# Patient Record
Sex: MALE | Race: WHITE | NOT HISPANIC OR LATINO
[De-identification: names, ages, dates, MRNs, and addresses within clinical notes are randomized per-mention and may not be internally consistent; named-entity substitution may affect disease eponyms.]

---

## 2022-11-22 ENCOUNTER — APPOINTMENT (OUTPATIENT)
Dept: CARDIOLOGY | Facility: CLINIC | Age: 62
End: 2022-11-22

## 2023-12-09 ENCOUNTER — INPATIENT (INPATIENT)
Facility: HOSPITAL | Age: 63
LOS: 0 days | Discharge: HOME | DRG: 309 | End: 2023-12-10
Attending: INTERNAL MEDICINE | Admitting: INTERNAL MEDICINE
Payer: COMMERCIAL

## 2023-12-09 VITALS
DIASTOLIC BLOOD PRESSURE: 114 MMHG | HEART RATE: 135 BPM | OXYGEN SATURATION: 97 % | RESPIRATION RATE: 22 BRPM | TEMPERATURE: 98 F | WEIGHT: 205.91 LBS | SYSTOLIC BLOOD PRESSURE: 175 MMHG

## 2023-12-09 DIAGNOSIS — Z98.890 OTHER SPECIFIED POSTPROCEDURAL STATES: Chronic | ICD-10-CM

## 2023-12-09 DIAGNOSIS — I48.91 UNSPECIFIED ATRIAL FIBRILLATION: ICD-10-CM

## 2023-12-09 LAB
ACANTHOCYTES BLD QL SMEAR: SLIGHT — SIGNIFICANT CHANGE UP
ACANTHOCYTES BLD QL SMEAR: SLIGHT — SIGNIFICANT CHANGE UP
ALBUMIN SERPL ELPH-MCNC: 4.3 G/DL — SIGNIFICANT CHANGE UP (ref 3.5–5.2)
ALBUMIN SERPL ELPH-MCNC: 4.3 G/DL — SIGNIFICANT CHANGE UP (ref 3.5–5.2)
ALP SERPL-CCNC: 245 U/L — HIGH (ref 30–115)
ALP SERPL-CCNC: 245 U/L — HIGH (ref 30–115)
ALT FLD-CCNC: 18 U/L — SIGNIFICANT CHANGE UP (ref 0–41)
ALT FLD-CCNC: 18 U/L — SIGNIFICANT CHANGE UP (ref 0–41)
ANION GAP SERPL CALC-SCNC: 15 MMOL/L — HIGH (ref 7–14)
ANION GAP SERPL CALC-SCNC: 15 MMOL/L — HIGH (ref 7–14)
ANISOCYTOSIS BLD QL: SLIGHT — SIGNIFICANT CHANGE UP
ANISOCYTOSIS BLD QL: SLIGHT — SIGNIFICANT CHANGE UP
APPEARANCE UR: CLEAR — SIGNIFICANT CHANGE UP
APPEARANCE UR: CLEAR — SIGNIFICANT CHANGE UP
AST SERPL-CCNC: 22 U/L — SIGNIFICANT CHANGE UP (ref 0–41)
AST SERPL-CCNC: 22 U/L — SIGNIFICANT CHANGE UP (ref 0–41)
BASE EXCESS BLDV CALC-SCNC: 0.7 MMOL/L — SIGNIFICANT CHANGE UP (ref -2–3)
BASE EXCESS BLDV CALC-SCNC: 0.7 MMOL/L — SIGNIFICANT CHANGE UP (ref -2–3)
BASOPHILS # BLD AUTO: 0 K/UL — SIGNIFICANT CHANGE UP (ref 0–0.2)
BASOPHILS # BLD AUTO: 0 K/UL — SIGNIFICANT CHANGE UP (ref 0–0.2)
BASOPHILS NFR BLD AUTO: 0 % — SIGNIFICANT CHANGE UP (ref 0–1)
BASOPHILS NFR BLD AUTO: 0 % — SIGNIFICANT CHANGE UP (ref 0–1)
BILIRUB SERPL-MCNC: 0.4 MG/DL — SIGNIFICANT CHANGE UP (ref 0.2–1.2)
BILIRUB SERPL-MCNC: 0.4 MG/DL — SIGNIFICANT CHANGE UP (ref 0.2–1.2)
BILIRUB UR-MCNC: NEGATIVE — SIGNIFICANT CHANGE UP
BILIRUB UR-MCNC: NEGATIVE — SIGNIFICANT CHANGE UP
BUN SERPL-MCNC: 17 MG/DL — SIGNIFICANT CHANGE UP (ref 10–20)
BUN SERPL-MCNC: 17 MG/DL — SIGNIFICANT CHANGE UP (ref 10–20)
BURR CELLS BLD QL SMEAR: PRESENT — SIGNIFICANT CHANGE UP
BURR CELLS BLD QL SMEAR: PRESENT — SIGNIFICANT CHANGE UP
CA-I SERPL-SCNC: 1.13 MMOL/L — LOW (ref 1.15–1.33)
CA-I SERPL-SCNC: 1.13 MMOL/L — LOW (ref 1.15–1.33)
CALCIUM SERPL-MCNC: 9.1 MG/DL — SIGNIFICANT CHANGE UP (ref 8.4–10.5)
CALCIUM SERPL-MCNC: 9.1 MG/DL — SIGNIFICANT CHANGE UP (ref 8.4–10.5)
CHLORIDE SERPL-SCNC: 101 MMOL/L — SIGNIFICANT CHANGE UP (ref 98–110)
CHLORIDE SERPL-SCNC: 101 MMOL/L — SIGNIFICANT CHANGE UP (ref 98–110)
CO2 SERPL-SCNC: 21 MMOL/L — SIGNIFICANT CHANGE UP (ref 17–32)
CO2 SERPL-SCNC: 21 MMOL/L — SIGNIFICANT CHANGE UP (ref 17–32)
COLOR SPEC: YELLOW — SIGNIFICANT CHANGE UP
COLOR SPEC: YELLOW — SIGNIFICANT CHANGE UP
CREAT SERPL-MCNC: 0.8 MG/DL — SIGNIFICANT CHANGE UP (ref 0.7–1.5)
CREAT SERPL-MCNC: 0.8 MG/DL — SIGNIFICANT CHANGE UP (ref 0.7–1.5)
DACRYOCYTES BLD QL SMEAR: SLIGHT — SIGNIFICANT CHANGE UP
DACRYOCYTES BLD QL SMEAR: SLIGHT — SIGNIFICANT CHANGE UP
DIFF PNL FLD: NEGATIVE — SIGNIFICANT CHANGE UP
DIFF PNL FLD: NEGATIVE — SIGNIFICANT CHANGE UP
EGFR: 98 ML/MIN/1.73M2 — SIGNIFICANT CHANGE UP
EGFR: 98 ML/MIN/1.73M2 — SIGNIFICANT CHANGE UP
ELLIPTOCYTES BLD QL SMEAR: SLIGHT — SIGNIFICANT CHANGE UP
ELLIPTOCYTES BLD QL SMEAR: SLIGHT — SIGNIFICANT CHANGE UP
EOSINOPHIL # BLD AUTO: 0.13 K/UL — SIGNIFICANT CHANGE UP (ref 0–0.7)
EOSINOPHIL # BLD AUTO: 0.13 K/UL — SIGNIFICANT CHANGE UP (ref 0–0.7)
EOSINOPHIL NFR BLD AUTO: 0.9 % — SIGNIFICANT CHANGE UP (ref 0–8)
EOSINOPHIL NFR BLD AUTO: 0.9 % — SIGNIFICANT CHANGE UP (ref 0–8)
GAS PNL BLDV: 130 MMOL/L — LOW (ref 136–145)
GAS PNL BLDV: 130 MMOL/L — LOW (ref 136–145)
GAS PNL BLDV: SIGNIFICANT CHANGE UP
GIANT PLATELETS BLD QL SMEAR: PRESENT — SIGNIFICANT CHANGE UP
GIANT PLATELETS BLD QL SMEAR: PRESENT — SIGNIFICANT CHANGE UP
GLUCOSE SERPL-MCNC: 120 MG/DL — HIGH (ref 70–99)
GLUCOSE SERPL-MCNC: 120 MG/DL — HIGH (ref 70–99)
GLUCOSE UR QL: NEGATIVE MG/DL — SIGNIFICANT CHANGE UP
GLUCOSE UR QL: NEGATIVE MG/DL — SIGNIFICANT CHANGE UP
HCO3 BLDV-SCNC: 25 MMOL/L — SIGNIFICANT CHANGE UP (ref 22–29)
HCO3 BLDV-SCNC: 25 MMOL/L — SIGNIFICANT CHANGE UP (ref 22–29)
HCT VFR BLD CALC: 34.6 % — LOW (ref 42–52)
HCT VFR BLD CALC: 34.6 % — LOW (ref 42–52)
HCT VFR BLDA CALC: 50 % — SIGNIFICANT CHANGE UP (ref 39–51)
HCT VFR BLDA CALC: 50 % — SIGNIFICANT CHANGE UP (ref 39–51)
HGB BLD CALC-MCNC: 16.5 G/DL — SIGNIFICANT CHANGE UP (ref 12.6–17.4)
HGB BLD CALC-MCNC: 16.5 G/DL — SIGNIFICANT CHANGE UP (ref 12.6–17.4)
HGB BLD-MCNC: 10.8 G/DL — LOW (ref 14–18)
HGB BLD-MCNC: 10.8 G/DL — LOW (ref 14–18)
KETONES UR-MCNC: 15 MG/DL
KETONES UR-MCNC: 15 MG/DL
LACTATE BLDV-MCNC: 1.6 MMOL/L — SIGNIFICANT CHANGE UP (ref 0.5–2)
LACTATE BLDV-MCNC: 1.6 MMOL/L — SIGNIFICANT CHANGE UP (ref 0.5–2)
LEUKOCYTE ESTERASE UR-ACNC: NEGATIVE — SIGNIFICANT CHANGE UP
LEUKOCYTE ESTERASE UR-ACNC: NEGATIVE — SIGNIFICANT CHANGE UP
LYMPHOCYTES # BLD AUTO: 23.2 % — SIGNIFICANT CHANGE UP (ref 20.5–51.1)
LYMPHOCYTES # BLD AUTO: 23.2 % — SIGNIFICANT CHANGE UP (ref 20.5–51.1)
LYMPHOCYTES # BLD AUTO: 3.36 K/UL — SIGNIFICANT CHANGE UP (ref 1.2–3.4)
LYMPHOCYTES # BLD AUTO: 3.36 K/UL — SIGNIFICANT CHANGE UP (ref 1.2–3.4)
MAGNESIUM SERPL-MCNC: 2 MG/DL — SIGNIFICANT CHANGE UP (ref 1.8–2.4)
MAGNESIUM SERPL-MCNC: 2 MG/DL — SIGNIFICANT CHANGE UP (ref 1.8–2.4)
MANUAL SMEAR VERIFICATION: SIGNIFICANT CHANGE UP
MANUAL SMEAR VERIFICATION: SIGNIFICANT CHANGE UP
MCHC RBC-ENTMCNC: 22.7 PG — LOW (ref 27–31)
MCHC RBC-ENTMCNC: 22.7 PG — LOW (ref 27–31)
MCHC RBC-ENTMCNC: 31.2 G/DL — LOW (ref 32–37)
MCHC RBC-ENTMCNC: 31.2 G/DL — LOW (ref 32–37)
MCV RBC AUTO: 72.7 FL — LOW (ref 80–94)
MCV RBC AUTO: 72.7 FL — LOW (ref 80–94)
METAMYELOCYTES # FLD: 0.9 % — HIGH (ref 0–0)
METAMYELOCYTES # FLD: 0.9 % — HIGH (ref 0–0)
MICROCYTES BLD QL: SLIGHT — SIGNIFICANT CHANGE UP
MICROCYTES BLD QL: SLIGHT — SIGNIFICANT CHANGE UP
MONOCYTES # BLD AUTO: 0.77 K/UL — HIGH (ref 0.1–0.6)
MONOCYTES # BLD AUTO: 0.77 K/UL — HIGH (ref 0.1–0.6)
MONOCYTES NFR BLD AUTO: 5.3 % — SIGNIFICANT CHANGE UP (ref 1.7–9.3)
MONOCYTES NFR BLD AUTO: 5.3 % — SIGNIFICANT CHANGE UP (ref 1.7–9.3)
MYELOCYTES NFR BLD: 0.9 % — HIGH (ref 0–0)
MYELOCYTES NFR BLD: 0.9 % — HIGH (ref 0–0)
NEUTROPHILS # BLD AUTO: 9.72 K/UL — HIGH (ref 1.4–6.5)
NEUTROPHILS # BLD AUTO: 9.72 K/UL — HIGH (ref 1.4–6.5)
NEUTROPHILS NFR BLD AUTO: 67 % — SIGNIFICANT CHANGE UP (ref 42.2–75.2)
NEUTROPHILS NFR BLD AUTO: 67 % — SIGNIFICANT CHANGE UP (ref 42.2–75.2)
NITRITE UR-MCNC: NEGATIVE — SIGNIFICANT CHANGE UP
NITRITE UR-MCNC: NEGATIVE — SIGNIFICANT CHANGE UP
NT-PROBNP SERPL-SCNC: 495 PG/ML — HIGH (ref 0–300)
NT-PROBNP SERPL-SCNC: 495 PG/ML — HIGH (ref 0–300)
PCO2 BLDV: 40 MMHG — LOW (ref 42–55)
PCO2 BLDV: 40 MMHG — LOW (ref 42–55)
PH BLDV: 7.41 — SIGNIFICANT CHANGE UP (ref 7.32–7.43)
PH BLDV: 7.41 — SIGNIFICANT CHANGE UP (ref 7.32–7.43)
PH UR: 7 — SIGNIFICANT CHANGE UP (ref 5–8)
PH UR: 7 — SIGNIFICANT CHANGE UP (ref 5–8)
PLAT MORPH BLD: NORMAL — SIGNIFICANT CHANGE UP
PLAT MORPH BLD: NORMAL — SIGNIFICANT CHANGE UP
PLATELET # BLD AUTO: 181 K/UL — SIGNIFICANT CHANGE UP (ref 130–400)
PLATELET # BLD AUTO: 181 K/UL — SIGNIFICANT CHANGE UP (ref 130–400)
PMV BLD: 10 FL — SIGNIFICANT CHANGE UP (ref 7.4–10.4)
PMV BLD: 10 FL — SIGNIFICANT CHANGE UP (ref 7.4–10.4)
PO2 BLDV: 41 MMHG — SIGNIFICANT CHANGE UP (ref 25–45)
PO2 BLDV: 41 MMHG — SIGNIFICANT CHANGE UP (ref 25–45)
POIKILOCYTOSIS BLD QL AUTO: SIGNIFICANT CHANGE UP
POIKILOCYTOSIS BLD QL AUTO: SIGNIFICANT CHANGE UP
POLYCHROMASIA BLD QL SMEAR: SLIGHT — SIGNIFICANT CHANGE UP
POLYCHROMASIA BLD QL SMEAR: SLIGHT — SIGNIFICANT CHANGE UP
POTASSIUM BLDV-SCNC: 3.2 MMOL/L — LOW (ref 3.5–5.1)
POTASSIUM BLDV-SCNC: 3.2 MMOL/L — LOW (ref 3.5–5.1)
POTASSIUM SERPL-MCNC: 3.5 MMOL/L — SIGNIFICANT CHANGE UP (ref 3.5–5)
POTASSIUM SERPL-MCNC: 3.5 MMOL/L — SIGNIFICANT CHANGE UP (ref 3.5–5)
POTASSIUM SERPL-SCNC: 3.5 MMOL/L — SIGNIFICANT CHANGE UP (ref 3.5–5)
POTASSIUM SERPL-SCNC: 3.5 MMOL/L — SIGNIFICANT CHANGE UP (ref 3.5–5)
PROT SERPL-MCNC: 6.1 G/DL — SIGNIFICANT CHANGE UP (ref 6–8)
PROT SERPL-MCNC: 6.1 G/DL — SIGNIFICANT CHANGE UP (ref 6–8)
PROT UR-MCNC: NEGATIVE MG/DL — SIGNIFICANT CHANGE UP
PROT UR-MCNC: NEGATIVE MG/DL — SIGNIFICANT CHANGE UP
RBC # BLD: 4.76 M/UL — SIGNIFICANT CHANGE UP (ref 4.7–6.1)
RBC # BLD: 4.76 M/UL — SIGNIFICANT CHANGE UP (ref 4.7–6.1)
RBC # FLD: 17 % — HIGH (ref 11.5–14.5)
RBC # FLD: 17 % — HIGH (ref 11.5–14.5)
RBC BLD AUTO: ABNORMAL
RBC BLD AUTO: ABNORMAL
SAO2 % BLDV: 60.8 % — LOW (ref 67–88)
SAO2 % BLDV: 60.8 % — LOW (ref 67–88)
SCHISTOCYTES BLD QL AUTO: SLIGHT — SIGNIFICANT CHANGE UP
SCHISTOCYTES BLD QL AUTO: SLIGHT — SIGNIFICANT CHANGE UP
SMUDGE CELLS # BLD: PRESENT — SIGNIFICANT CHANGE UP
SMUDGE CELLS # BLD: PRESENT — SIGNIFICANT CHANGE UP
SODIUM SERPL-SCNC: 137 MMOL/L — SIGNIFICANT CHANGE UP (ref 135–146)
SODIUM SERPL-SCNC: 137 MMOL/L — SIGNIFICANT CHANGE UP (ref 135–146)
SP GR SPEC: 1.01 — SIGNIFICANT CHANGE UP (ref 1–1.03)
SP GR SPEC: 1.01 — SIGNIFICANT CHANGE UP (ref 1–1.03)
TROPONIN T SERPL-MCNC: <0.01 NG/ML — SIGNIFICANT CHANGE UP
TROPONIN T SERPL-MCNC: <0.01 NG/ML — SIGNIFICANT CHANGE UP
UROBILINOGEN FLD QL: 0.2 MG/DL — SIGNIFICANT CHANGE UP (ref 0.2–1)
UROBILINOGEN FLD QL: 0.2 MG/DL — SIGNIFICANT CHANGE UP (ref 0.2–1)
VARIANT LYMPHS # BLD: 1.8 % — SIGNIFICANT CHANGE UP (ref 0–5)
VARIANT LYMPHS # BLD: 1.8 % — SIGNIFICANT CHANGE UP (ref 0–5)
WBC # BLD: 14.5 K/UL — HIGH (ref 4.8–10.8)
WBC # BLD: 14.5 K/UL — HIGH (ref 4.8–10.8)
WBC # FLD AUTO: 14.5 K/UL — HIGH (ref 4.8–10.8)
WBC # FLD AUTO: 14.5 K/UL — HIGH (ref 4.8–10.8)

## 2023-12-09 PROCEDURE — 71045 X-RAY EXAM CHEST 1 VIEW: CPT | Mod: 26

## 2023-12-09 PROCEDURE — 85025 COMPLETE CBC W/AUTO DIFF WBC: CPT

## 2023-12-09 PROCEDURE — 93005 ELECTROCARDIOGRAM TRACING: CPT

## 2023-12-09 PROCEDURE — 85730 THROMBOPLASTIN TIME PARTIAL: CPT

## 2023-12-09 PROCEDURE — 84484 ASSAY OF TROPONIN QUANT: CPT

## 2023-12-09 PROCEDURE — G0378: CPT

## 2023-12-09 PROCEDURE — 83735 ASSAY OF MAGNESIUM: CPT

## 2023-12-09 PROCEDURE — 36415 COLL VENOUS BLD VENIPUNCTURE: CPT

## 2023-12-09 PROCEDURE — 85610 PROTHROMBIN TIME: CPT

## 2023-12-09 PROCEDURE — 84443 ASSAY THYROID STIM HORMONE: CPT

## 2023-12-09 PROCEDURE — 93010 ELECTROCARDIOGRAM REPORT: CPT | Mod: 76

## 2023-12-09 PROCEDURE — 99291 CRITICAL CARE FIRST HOUR: CPT

## 2023-12-09 PROCEDURE — 80053 COMPREHEN METABOLIC PANEL: CPT

## 2023-12-09 PROCEDURE — 93306 TTE W/DOPPLER COMPLETE: CPT

## 2023-12-09 RX ORDER — DILTIAZEM HCL 120 MG
10 CAPSULE, EXT RELEASE 24 HR ORAL
Qty: 125 | Refills: 0 | Status: DISCONTINUED | OUTPATIENT
Start: 2023-12-09 | End: 2023-12-10

## 2023-12-09 RX ORDER — APIXABAN 2.5 MG/1
5 TABLET, FILM COATED ORAL EVERY 12 HOURS
Refills: 0 | Status: DISCONTINUED | OUTPATIENT
Start: 2023-12-10 | End: 2023-12-10

## 2023-12-09 RX ORDER — HEPARIN SODIUM 5000 [USP'U]/ML
5000 INJECTION INTRAVENOUS; SUBCUTANEOUS EVERY 8 HOURS
Refills: 0 | Status: DISCONTINUED | OUTPATIENT
Start: 2023-12-09 | End: 2023-12-09

## 2023-12-09 RX ORDER — APIXABAN 2.5 MG/1
5 TABLET, FILM COATED ORAL ONCE
Refills: 0 | Status: COMPLETED | OUTPATIENT
Start: 2023-12-09 | End: 2023-12-09

## 2023-12-09 RX ORDER — DILTIAZEM HCL 120 MG
5 CAPSULE, EXT RELEASE 24 HR ORAL
Qty: 125 | Refills: 0 | Status: DISCONTINUED | OUTPATIENT
Start: 2023-12-09 | End: 2023-12-10

## 2023-12-09 RX ORDER — POTASSIUM CHLORIDE 20 MEQ
40 PACKET (EA) ORAL ONCE
Refills: 0 | Status: DISCONTINUED | OUTPATIENT
Start: 2023-12-09 | End: 2023-12-10

## 2023-12-09 RX ORDER — PANTOPRAZOLE SODIUM 20 MG/1
40 TABLET, DELAYED RELEASE ORAL
Refills: 0 | Status: DISCONTINUED | OUTPATIENT
Start: 2023-12-09 | End: 2023-12-10

## 2023-12-09 RX ORDER — DILTIAZEM HCL 120 MG
20 CAPSULE, EXT RELEASE 24 HR ORAL ONCE
Refills: 0 | Status: COMPLETED | OUTPATIENT
Start: 2023-12-09 | End: 2023-12-09

## 2023-12-09 RX ADMIN — Medication 20 MILLIGRAM(S): at 17:50

## 2023-12-09 RX ADMIN — Medication 10 MG/HR: at 21:37

## 2023-12-09 RX ADMIN — Medication 20 MILLIGRAM(S): at 15:35

## 2023-12-09 RX ADMIN — APIXABAN 5 MILLIGRAM(S): 2.5 TABLET, FILM COATED ORAL at 18:43

## 2023-12-09 RX ADMIN — Medication 5 MG/HR: at 18:45

## 2023-12-09 NOTE — ED ADULT NURSE NOTE - NSFALLHARMRISKINTERV_ED_ALL_ED
Communicate risk of Fall with Harm to all staff, patient, and family/Provide visual cue: red socks, yellow wristband, yellow gown, etc/Reinforce activity limits and safety measures with patient and family/Bed in lowest position, wheels locked, appropriate side rails in place/Call bell, personal items and telephone in reach/Instruct patient to call for assistance before getting out of bed/chair/stretcher/Non-slip footwear applied when patient is off stretcher/[place] to call system/Physically safe environment - no spills, clutter or unnecessary equipment/Purposeful Proactive Rounding/Room/bathroom lighting operational, light cord in reach Communicate risk of Fall with Harm to all staff, patient, and family/Provide visual cue: red socks, yellow wristband, yellow gown, etc/Reinforce activity limits and safety measures with patient and family/Bed in lowest position, wheels locked, appropriate side rails in place/Call bell, personal items and telephone in reach/Instruct patient to call for assistance before getting out of bed/chair/stretcher/Non-slip footwear applied when patient is off stretcher/[place] to call system/Physically safe environment - no spills, clutter or unnecessary equipment/Purposeful Proactive Rounding/Room/bathroom lighting operational, light cord in reach

## 2023-12-09 NOTE — ED PROVIDER NOTE - DIFFERENTIAL DIAGNOSIS
Differential Diagnosis The differential diagnosis for patients clinical presentation includes but is not limited to:  ACS, MI, aortic dissection, pneumothorax, pneumonia, MSK, pulmonary embolism  A Fib RVR

## 2023-12-09 NOTE — ED PROVIDER NOTE - WR ORDER NAME 1
What Type Of Note Output Would You Prefer (Optional)?: Bullet Format Xray Chest 1 View-PORTABLE IMMEDIATE How Severe Is Your Rash?: mild Is This A New Presentation, Or A Follow-Up?: Rash

## 2023-12-09 NOTE — ED PROVIDER NOTE - CARE PLAN
Principal Discharge DX:	Atrial fibrillation   1 Principal Discharge DX:	Atrial fibrillation with RVR

## 2023-12-09 NOTE — ED PROVIDER NOTE - CLINICAL SUMMARY MEDICAL DECISION MAKING FREE TEXT BOX
63-year-old male past medical history of prostate cancer with prior chemotherapy and surgery, hypertension, dyslipidemia, diabetes, WPW, CHF presents to the emergency department for palpitations that have been worsening over the past 2 weeks.  Patient had heart rate at home in the 140s.  Patient recent hospitalization in [place] and was started on diltiazem without anticoagulation.  Additional history obtained from patient's wife and they deny ever being told patient had atrial fibrillation.  Patient has no history of DVT or PE.  Patient denies any fever, chills, cough, URI symptoms.  No calf pain or leg swelling.    On exam, vital signs reviewed.  Patient is tachycardic in A-fib with RVR.  Patient has a narrow complex rhythm and is not in antidromic WPW.  Patient also took his home dose of diltiazem.  Patient is irregularly irregular and has clear lungs.  Gross neurological exam is unremarkable.  No calf tenderness or signs of fluid overload.  Benign abdominal exam.  IV placed and labs sent, chest x-ray performed.  Patient given multiple doses of IV diltiazem and was started on a drip.  VBG shows normal acid-base status, troponin negative, patient has a baseline white blood cell count of 14.5, electrolytes unremarkable.  Will admit to telemetry on a diltiazem drip for rate control for A-fib with RVR.  Patient Chads-Vasc is 3 and will start Eliquis.    ED work up reviewed and results and plan of care discussed with patient. Patient requires admission for further work up, monitoring, and management. Need for admission discussed with patient. 63-year-old male past medical history of prostate cancer with prior chemotherapy and surgery, hypertension, dyslipidemia, diabetes, WPW, CHF presents to the emergency department for palpitations that have been worsening over the past 2 weeks.  Patient had heart rate at home in the 140s.  Patient recent hospitalization in [place] and was started on diltiazem without anticoagulation.  Additional history obtained from patient's wife and they deny ever being told patient had atrial fibrillation.  Patient has no history of DVT or PE.  Patient denies any fever, chills, cough, URI symptoms.  No calf pain or leg swelling.    On exam, vital signs reviewed.  Patient is tachycardic in A-fib with RVR.  Patient has a narrow complex rhythm and is not in antidromic WPW.  Patient also took his home dose of diltiazem.  Patient is irregularly irregular and has clear lungs.  Gross neurological exam is unremarkable.  No calf tenderness or signs of fluid overload.  Benign abdominal exam.  IV placed and labs sent, chest x-ray performed.  Patient given multiple doses of IV diltiazem and was started on a drip.  VBG shows normal acid-base status, troponin negative, patient has a baseline white blood cell count of 14.5, electrolytes unremarkable.  Will admit to telemetry on a diltiazem drip for rate control for A-fib with RVR.  Patient Chads-Vasc is 3 and will start Eliquis.    ED work up reviewed and results and plan of care discussed with patient. Patient requires admission for further work up, monitoring, and management. Need for admission discussed with patient.

## 2023-12-09 NOTE — ED PROVIDER NOTE - PROGRESS NOTE DETAILS
TOSHIA: Heart rate improved somewhat after 2X push of Cardizem IV however still in 120s to 130s.  Started on Cardizem drip.  [id number] score 3.  Given Eliquis 1 dose.  Admitted to cardiac telemetry TOSHIA: Heart rate improved somewhat after 2X push of Cardizem IV however still in 120s to 130s.  Started on Cardizem drip.  [id number] score 3.  Given Eliquis 1 dose.  Admitted to cardiac telemetry

## 2023-12-09 NOTE — ED PROVIDER NOTE - PHYSICAL EXAMINATION
VITAL SIGNS: I have reviewed nursing notes and confirm.  CONSTITUTIONAL: Well-developed; well-nourished; in no acute distress.  SKIN: Skin exam is warm and dry, no acute rash.  EYES: PERRL; conjunctiva and sclera clear.  ENT: mmm  CARD: S1, S2 tachycardic, irregular, well perfused  RESP: Normal respiratory effort, no tachypnea or distress. Lungs CTAB, no wheezes, rales or rhonchi.  ABD: soft, NT/ND.  EXT: Normal ROM. No clubbing, cyanosis or edema.  NEURO: Alert, oriented. Grossly unremarkable. No focal deficits.  PSYCH: Cooperative, appropriate.

## 2023-12-09 NOTE — H&P CARDIOLOGY - HISTORY OF PRESENT ILLNESS
63-year-old male past medical history of prostate cancer with prior chemotherapy and surgery, hypertension, dyslipidemia, diabetes, WPW, CHF presents to the emergency department for palpitations that have been worsening over the past 2 weeks.  Patient had heart rate at home in the 140s.  Patient recent hospitalization in [place] and was started on diltiazem without anticoagulation.      63-year-old male past medical history of prostate cancer with prior chemotherapy and surgery, hypertension, dyslipidemia, diabetes, WPW, CHF presents to the emergency department for palpitations that have been worsening over the past 2 weeks.  Patient had heart rate at home in the 140s.  Patient recent hospitalization in [place] and was started on diltiazem without anticoagulation.      63-year-old male past medical history of prostate cancer with prior chemotherapy and surgery, hypertension, dyslipidemia, diabetes, WPW, CHF presents to the emergency department for palpitations that have been worsening over the past 2 weeks.  Patient had heart rate at home in the 140s.  Patient recent hospitalization in [place] and was started on diltiazem without anticoagulation. He feels short of breath, as if he cannot take a deep breath in. He denies chest pain. No syncope. No leg swelling, orthopnea or PND.      63-year-old male past medical history of prostate cancer with prior chemotherapy and surgery, hypertension, dyslipidemia, diabetes, WPW, CHF presents to the emergency department for palpitations that have been worsening over the past 2 weeks.  Patient had heart rate at home in the 140s.  Patient recent hospitalization in [place] and was started on diltiazem without anticoagulation. He feels short of breath, as if he cannot take a deep breath in. He denies chest pain. No syncope. No leg swelling, orthopnea or PND.

## 2023-12-09 NOTE — H&P CARDIOLOGY - NS ATTEND BILL GEN_ALL_CORE
Please advise message below. Would you like to evaluate patient before prescribing? Patient has not been seen in over a year.  She does have an upcoming appointment on 3/21/23.        Attending to bill

## 2023-12-09 NOTE — H&P CARDIOLOGY - TIME BILLING
Chart review, bedside evaluation, discussion with patient and family, medical record review from [facility].

## 2023-12-09 NOTE — H&P CARDIOLOGY - NS ATTEND AMEND GEN_ALL_CORE FT
Briefly, 63 year old man with Prostate CA presents with palpitations and shortness of breath, found to be in new onset atrial fibrillation. He says his symptoms have been going on for two weeks. He went [place] [facility] [place] and was told he had some "fluid in his lungs". He was started on Cardizem which he has not been taking. On 4T, he was started on a cardizem drip and converted to sinus rhythm.     VS, PE as above.    Telemetry, labs, imaging personally reviewed.     Plan:  - His CHADSVASC is 3; start eliquis 5mg BID. He has no history of bleeding. He has no surgeries coming up. We checked interactions with his chemotherapy and there are none.   - Stop cardizem drip and start metoprolol tartrate 25mg PO BID  - His echocardiogram was reviewed and LVEF is preserved. IVC is dilated and not collapsing. He most likely has HFpEF. Will do a trial of lasix IV.   - D/c planning for tomorrow. I will see him in the office on discharge. Briefly, 63 year old man with Prostate CA presents with palpitations and shortness of breath, found to be in new onset atrial fibrillation. He says his symptoms have been going on for two weeks. He went to [facility] and was told he had some "fluid in his lungs". He was started on Cardizem which he has not been taking. On 4T, he was started on a cardizem drip and converted to sinus rhythm.     VS, PE as above.    Telemetry, labs, imaging personally reviewed.     Plan:  - His CHADSVASC is 3; start eliquis 5mg BID. He has no history of bleeding. He has no surgeries coming up. We checked interactions with his chemotherapy and there are none.   - Stop cardizem drip and start metoprolol tartrate 25mg PO BID  - His echocardiogram was reviewed and LVEF is preserved. IVC is dilated and not collapsing. He most likely has HFpEF. Will do a trial of lasix IV.   - D/c planning for tomorrow. I will see him in the office on discharge.

## 2023-12-09 NOTE — ED PROVIDER NOTE - OBJECTIVE STATEMENT
63-year-old male with PMH of prostate CA s/p chemo and surgery, HTN, HLD, DM,?  CHF, presents ED for evaluation of palpitations and rapid heartbeat x 2 weeks worse today.  Reports heart rate of 140s at home so came to ED for evaluation.  + SOB today.  Patient states he was recently admitted to the hospital 2 weeks ago [place] where he follows with cardiology Dr. Lam, at that time had rapid heart rate and started on diltiazem but no anticoagulation.  Denies fever, cough, congestion, CP, abdominal pain, N/V/D, urinary symptoms. 63-year-old male with PMH of prostate CA s/p chemo and surgery, HTN, HLD, DM,?  CHF, presents ED for evaluation of palpitations and rapid heartbeat x 2 weeks worse today.  Reports heart rate of 140s at home so came to ED for evaluation.  + SOB today.  Patient states he was recently admitted to the hospital 2 weeks ago [place] where he follows with cardiology Dr. Lam, at that time had rapid heart rate and started on diltiazem but no anticoagulation.  Denies fever, cough, congestion, CP, abdominal pain, N/V/D, urinary symptoms.

## 2023-12-09 NOTE — ED PROVIDER NOTE - NSICDXPASTMEDICALHX_GEN_ALL_CORE_FT
PAST MEDICAL HISTORY:  DM (diabetes mellitus)     HLD (hyperlipidemia)     HTN (hypertension)     Prostate cancer

## 2023-12-09 NOTE — H&P CARDIOLOGY - COMMENTS
A-Fib   -Cardizem 10 mg/hr for rate control   -Elquis   -Echo  - EKG   -Labs     Prostate CA with mets to bone  -Continue Prednisone 2.5 Home dose  -F/u With MSK     GI/DVT Prophylaxis     Diet    Activity as tolerated A-Fib   -Cardizem 10 mg/hr for rate control   -Eliquis   -Echo  - EKG   -Labs     Prostate CA with mets to bone  -Continue Prednisone 2.5 Home dose  -F/u With MSK     GI/DVT Prophylaxis     Diet    Activity as tolerated

## 2023-12-09 NOTE — H&P CARDIOLOGY - GASTROINTESTINAL
Soft, non-tender, no hepatosplenomegaly, normal bowel sounds Advancement Flap (Double) Text: Due to geometric and functional constraints, a flap reconstruction was performed to reconstruct the defect. To that end, adjacent tissue was incised and carried over to close the defect in the following manner: The defect edges were debeveled with a #15 scalpel blade.  Given the location of the defect and the proximity to free margins a double advancement flap was deemed most appropriate.  Using a sterile surgical marker, the appropriate advancement flaps were drawn incorporating the defect and placing the expected incisions within the relaxed skin tension lines where possible.    The area thus outlined was incised deep to adipose tissue with a #15 scalpel blade.  The skin margins were undermined to an appropriate distance in all directions utilizing iris scissors.

## 2023-12-10 ENCOUNTER — TRANSCRIPTION ENCOUNTER (OUTPATIENT)
Age: 63
End: 2023-12-10

## 2023-12-10 VITALS
HEART RATE: 87 BPM | SYSTOLIC BLOOD PRESSURE: 114 MMHG | RESPIRATION RATE: 18 BRPM | OXYGEN SATURATION: 95 % | TEMPERATURE: 98 F | DIASTOLIC BLOOD PRESSURE: 58 MMHG

## 2023-12-10 LAB
ALBUMIN SERPL ELPH-MCNC: 3.7 G/DL — SIGNIFICANT CHANGE UP (ref 3.5–5.2)
ALBUMIN SERPL ELPH-MCNC: 3.7 G/DL — SIGNIFICANT CHANGE UP (ref 3.5–5.2)
ALP SERPL-CCNC: 225 U/L — HIGH (ref 30–115)
ALP SERPL-CCNC: 225 U/L — HIGH (ref 30–115)
ALT FLD-CCNC: 16 U/L — SIGNIFICANT CHANGE UP (ref 0–41)
ALT FLD-CCNC: 16 U/L — SIGNIFICANT CHANGE UP (ref 0–41)
ANION GAP SERPL CALC-SCNC: 13 MMOL/L — SIGNIFICANT CHANGE UP (ref 7–14)
ANION GAP SERPL CALC-SCNC: 13 MMOL/L — SIGNIFICANT CHANGE UP (ref 7–14)
APTT BLD: 31.2 SEC — SIGNIFICANT CHANGE UP (ref 27–39.2)
APTT BLD: 31.2 SEC — SIGNIFICANT CHANGE UP (ref 27–39.2)
AST SERPL-CCNC: 16 U/L — SIGNIFICANT CHANGE UP (ref 0–41)
AST SERPL-CCNC: 16 U/L — SIGNIFICANT CHANGE UP (ref 0–41)
BASOPHILS # BLD AUTO: 0.03 K/UL — SIGNIFICANT CHANGE UP (ref 0–0.2)
BASOPHILS # BLD AUTO: 0.03 K/UL — SIGNIFICANT CHANGE UP (ref 0–0.2)
BASOPHILS NFR BLD AUTO: 0.2 % — SIGNIFICANT CHANGE UP (ref 0–1)
BASOPHILS NFR BLD AUTO: 0.2 % — SIGNIFICANT CHANGE UP (ref 0–1)
BILIRUB SERPL-MCNC: 0.4 MG/DL — SIGNIFICANT CHANGE UP (ref 0.2–1.2)
BILIRUB SERPL-MCNC: 0.4 MG/DL — SIGNIFICANT CHANGE UP (ref 0.2–1.2)
BUN SERPL-MCNC: 14 MG/DL — SIGNIFICANT CHANGE UP (ref 10–20)
BUN SERPL-MCNC: 14 MG/DL — SIGNIFICANT CHANGE UP (ref 10–20)
CALCIUM SERPL-MCNC: 8.7 MG/DL — SIGNIFICANT CHANGE UP (ref 8.4–10.5)
CALCIUM SERPL-MCNC: 8.7 MG/DL — SIGNIFICANT CHANGE UP (ref 8.4–10.5)
CHLORIDE SERPL-SCNC: 103 MMOL/L — SIGNIFICANT CHANGE UP (ref 98–110)
CHLORIDE SERPL-SCNC: 103 MMOL/L — SIGNIFICANT CHANGE UP (ref 98–110)
CO2 SERPL-SCNC: 23 MMOL/L — SIGNIFICANT CHANGE UP (ref 17–32)
CO2 SERPL-SCNC: 23 MMOL/L — SIGNIFICANT CHANGE UP (ref 17–32)
CREAT SERPL-MCNC: 0.9 MG/DL — SIGNIFICANT CHANGE UP (ref 0.7–1.5)
CREAT SERPL-MCNC: 0.9 MG/DL — SIGNIFICANT CHANGE UP (ref 0.7–1.5)
EGFR: 95 ML/MIN/1.73M2 — SIGNIFICANT CHANGE UP
EGFR: 95 ML/MIN/1.73M2 — SIGNIFICANT CHANGE UP
EOSINOPHIL # BLD AUTO: 0.04 K/UL — SIGNIFICANT CHANGE UP (ref 0–0.7)
EOSINOPHIL # BLD AUTO: 0.04 K/UL — SIGNIFICANT CHANGE UP (ref 0–0.7)
EOSINOPHIL NFR BLD AUTO: 0.3 % — SIGNIFICANT CHANGE UP (ref 0–8)
EOSINOPHIL NFR BLD AUTO: 0.3 % — SIGNIFICANT CHANGE UP (ref 0–8)
GLUCOSE SERPL-MCNC: 124 MG/DL — HIGH (ref 70–99)
GLUCOSE SERPL-MCNC: 124 MG/DL — HIGH (ref 70–99)
HCT VFR BLD CALC: 32.7 % — LOW (ref 42–52)
HCT VFR BLD CALC: 32.7 % — LOW (ref 42–52)
HGB BLD-MCNC: 10.3 G/DL — LOW (ref 14–18)
HGB BLD-MCNC: 10.3 G/DL — LOW (ref 14–18)
IMM GRANULOCYTES NFR BLD AUTO: 1.1 % — HIGH (ref 0.1–0.3)
IMM GRANULOCYTES NFR BLD AUTO: 1.1 % — HIGH (ref 0.1–0.3)
INR BLD: 1.42 RATIO — HIGH (ref 0.65–1.3)
INR BLD: 1.42 RATIO — HIGH (ref 0.65–1.3)
LYMPHOCYTES # BLD AUTO: 61.1 % — HIGH (ref 20.5–51.1)
LYMPHOCYTES # BLD AUTO: 61.1 % — HIGH (ref 20.5–51.1)
LYMPHOCYTES # BLD AUTO: 8.8 K/UL — HIGH (ref 1.2–3.4)
LYMPHOCYTES # BLD AUTO: 8.8 K/UL — HIGH (ref 1.2–3.4)
MAGNESIUM SERPL-MCNC: 1.9 MG/DL — SIGNIFICANT CHANGE UP (ref 1.8–2.4)
MAGNESIUM SERPL-MCNC: 1.9 MG/DL — SIGNIFICANT CHANGE UP (ref 1.8–2.4)
MCHC RBC-ENTMCNC: 22.5 PG — LOW (ref 27–31)
MCHC RBC-ENTMCNC: 22.5 PG — LOW (ref 27–31)
MCHC RBC-ENTMCNC: 31.5 G/DL — LOW (ref 32–37)
MCHC RBC-ENTMCNC: 31.5 G/DL — LOW (ref 32–37)
MCV RBC AUTO: 71.6 FL — LOW (ref 80–94)
MCV RBC AUTO: 71.6 FL — LOW (ref 80–94)
MONOCYTES # BLD AUTO: 0.94 K/UL — HIGH (ref 0.1–0.6)
MONOCYTES # BLD AUTO: 0.94 K/UL — HIGH (ref 0.1–0.6)
MONOCYTES NFR BLD AUTO: 6.5 % — SIGNIFICANT CHANGE UP (ref 1.7–9.3)
MONOCYTES NFR BLD AUTO: 6.5 % — SIGNIFICANT CHANGE UP (ref 1.7–9.3)
NEUTROPHILS # BLD AUTO: 4.43 K/UL — SIGNIFICANT CHANGE UP (ref 1.4–6.5)
NEUTROPHILS # BLD AUTO: 4.43 K/UL — SIGNIFICANT CHANGE UP (ref 1.4–6.5)
NEUTROPHILS NFR BLD AUTO: 30.8 % — LOW (ref 42.2–75.2)
NEUTROPHILS NFR BLD AUTO: 30.8 % — LOW (ref 42.2–75.2)
NRBC # BLD: 0 /100 WBCS — SIGNIFICANT CHANGE UP (ref 0–0)
NRBC # BLD: 0 /100 WBCS — SIGNIFICANT CHANGE UP (ref 0–0)
PLATELET # BLD AUTO: 199 K/UL — SIGNIFICANT CHANGE UP (ref 130–400)
PLATELET # BLD AUTO: 199 K/UL — SIGNIFICANT CHANGE UP (ref 130–400)
PMV BLD: 10.7 FL — HIGH (ref 7.4–10.4)
PMV BLD: 10.7 FL — HIGH (ref 7.4–10.4)
POTASSIUM SERPL-MCNC: 3.3 MMOL/L — LOW (ref 3.5–5)
POTASSIUM SERPL-MCNC: 3.3 MMOL/L — LOW (ref 3.5–5)
POTASSIUM SERPL-SCNC: 3.3 MMOL/L — LOW (ref 3.5–5)
POTASSIUM SERPL-SCNC: 3.3 MMOL/L — LOW (ref 3.5–5)
PROT SERPL-MCNC: 5.5 G/DL — LOW (ref 6–8)
PROT SERPL-MCNC: 5.5 G/DL — LOW (ref 6–8)
PROTHROM AB SERPL-ACNC: 16.3 SEC — HIGH (ref 9.95–12.87)
PROTHROM AB SERPL-ACNC: 16.3 SEC — HIGH (ref 9.95–12.87)
RBC # BLD: 4.57 M/UL — LOW (ref 4.7–6.1)
RBC # BLD: 4.57 M/UL — LOW (ref 4.7–6.1)
RBC # FLD: 17.1 % — HIGH (ref 11.5–14.5)
RBC # FLD: 17.1 % — HIGH (ref 11.5–14.5)
SODIUM SERPL-SCNC: 139 MMOL/L — SIGNIFICANT CHANGE UP (ref 135–146)
SODIUM SERPL-SCNC: 139 MMOL/L — SIGNIFICANT CHANGE UP (ref 135–146)
TROPONIN T SERPL-MCNC: <0.01 NG/ML — SIGNIFICANT CHANGE UP
TROPONIN T SERPL-MCNC: <0.01 NG/ML — SIGNIFICANT CHANGE UP
WBC # BLD: 14.4 K/UL — HIGH (ref 4.8–10.8)
WBC # BLD: 14.4 K/UL — HIGH (ref 4.8–10.8)
WBC # FLD AUTO: 14.4 K/UL — HIGH (ref 4.8–10.8)
WBC # FLD AUTO: 14.4 K/UL — HIGH (ref 4.8–10.8)

## 2023-12-10 PROCEDURE — 99223 1ST HOSP IP/OBS HIGH 75: CPT

## 2023-12-10 PROCEDURE — 93306 TTE W/DOPPLER COMPLETE: CPT | Mod: 26

## 2023-12-10 PROCEDURE — 93010 ELECTROCARDIOGRAM REPORT: CPT

## 2023-12-10 RX ORDER — INFLUENZA VIRUS VACCINE 15; 15; 15; 15 UG/.5ML; UG/.5ML; UG/.5ML; UG/.5ML
0.7 SUSPENSION INTRAMUSCULAR ONCE
Refills: 0 | Status: DISCONTINUED | OUTPATIENT
Start: 2023-12-10 | End: 2023-12-10

## 2023-12-10 RX ORDER — POTASSIUM CHLORIDE 20 MEQ
40 PACKET (EA) ORAL EVERY 4 HOURS
Refills: 0 | Status: COMPLETED | OUTPATIENT
Start: 2023-12-10 | End: 2023-12-10

## 2023-12-10 RX ORDER — METOPROLOL TARTRATE 50 MG
25 TABLET ORAL
Refills: 0 | Status: DISCONTINUED | OUTPATIENT
Start: 2023-12-10 | End: 2023-12-10

## 2023-12-10 RX ORDER — PANTOPRAZOLE SODIUM 20 MG/1
1 TABLET, DELAYED RELEASE ORAL
Qty: 30 | Refills: 11
Start: 2023-12-10 | End: 2024-12-03

## 2023-12-10 RX ORDER — METOPROLOL TARTRATE 50 MG
1 TABLET ORAL
Qty: 60 | Refills: 6
Start: 2023-12-10 | End: 2024-07-06

## 2023-12-10 RX ORDER — FUROSEMIDE 40 MG
40 TABLET ORAL ONCE
Refills: 0 | Status: COMPLETED | OUTPATIENT
Start: 2023-12-10 | End: 2023-12-10

## 2023-12-10 RX ORDER — FUROSEMIDE 40 MG
1 TABLET ORAL
Qty: 30 | Refills: 0
Start: 2023-12-10

## 2023-12-10 RX ORDER — APIXABAN 2.5 MG/1
1 TABLET, FILM COATED ORAL
Qty: 60 | Refills: 6
Start: 2023-12-10 | End: 2024-07-06

## 2023-12-10 RX ADMIN — Medication 2.5 MILLIGRAM(S): at 05:24

## 2023-12-10 RX ADMIN — Medication 40 MILLIEQUIVALENT(S): at 13:52

## 2023-12-10 RX ADMIN — APIXABAN 5 MILLIGRAM(S): 2.5 TABLET, FILM COATED ORAL at 05:23

## 2023-12-10 RX ADMIN — Medication 40 MILLIGRAM(S): at 12:35

## 2023-12-10 RX ADMIN — PANTOPRAZOLE SODIUM 40 MILLIGRAM(S): 20 TABLET, DELAYED RELEASE ORAL at 05:23

## 2023-12-10 NOTE — DISCHARGE NOTE PROVIDER - NSDCMRMEDTOKEN_GEN_ALL_CORE_FT
apixaban 5 mg oral tablet: 1 tab(s) orally every 12 hours  Lasix 20 mg oral tablet: 1 tab(s) orally once a day as needed for  shortness of breath and/or wheezing  metoprolol tartrate 25 mg oral tablet: 1 tab(s) orally 2 times a day  pantoprazole 40 mg oral delayed release tablet: 1 tab(s) orally once a day (before a meal)  predniSONE 2.5 mg oral tablet: 1 tab(s) orally once a day

## 2023-12-10 NOTE — DISCHARGE NOTE PROVIDER - ATTENDING ATTESTATION STATEMENT
lower/medial
I have personally seen and examined the patient. I have collaborated with and supervised the

## 2023-12-10 NOTE — DISCHARGE NOTE PROVIDER - PROVIDER TOKENS
PROVIDER:[TOKEN:[[id number]],FOLLOWUP:[2 weeks]] PROVIDER:[TOKEN:[[id number]],FOLLOWUP:[2 weeks]]

## 2023-12-10 NOTE — DISCHARGE NOTE NURSING/CASE MANAGEMENT/SOCIAL WORK - NSDCPEFALRISK_GEN_ALL_CORE
For information on Fall & Injury Prevention, visit: [email or web address] OR  [email or web address] OR  https://www.cdc.gov/steadi/patient.html For information on Fall & Injury Prevention, visit: [email or web address] OR  [email or web address] OR  https://www.cdc.gov/steadi/patient.html

## 2023-12-10 NOTE — DISCHARGE NOTE NURSING/CASE MANAGEMENT/SOCIAL WORK - PATIENT PORTAL LINK FT
You can access the FollowMyHealth Patient Portal offered by [facility] by registering at the following website: [email or web address]. By joining [employer]’s FollowMyHealth portal, you will also be able to view your health information using other applications (apps) compatible with our system. You can access the FollowMyHealth Patient Portal offered by [facility] by registering at the following website: [email or web address]. By joining [employer]’s FollowMyHealth portal, you will also be able to view your health information using other applications (apps) compatible with our system.

## 2023-12-10 NOTE — PROGRESS NOTE ADULT - ASSESSMENT
Assessment:  63 year old man with Prostate CA presents with palpitations and shortness of breath, found to be in new onset atrial fibrillation. He says his symptoms have been going on for two weeks. He went to [facility] and was told he had some "fluid in his lungs". He was started on Cardizem which he has not been taking. On 4T, he was started on a cardizem drip and converted to sinus rhythm.       Problems discussed and associated plan:  #A-Fib  - Monitor on tele  - Replete electrolytes, K>4 and Mag>2   -s/p Cardizem ggt 10 mg/hr for rate control  - Transition to metoprolol tartrate 25mg BID   -start Eliquis (does not interact with medication pluvicto)   -f/u TTE read  - EKG: NS this AM  - IV Lasix 40mg x1 (IVC large on TTE)    #Prostate CA with mets to bone  -Continue Prednisone 2.5 Home dose  - Pluvicto every 6 weeks  - F/u With [facility]     GI/DVT Prophylaxis  Please contact me with any questions or concerns at [phone]. Assessment:  63 year old man with Prostate CA presents with palpitations and shortness of breath, found to be in new onset atrial fibrillation. He says his symptoms have been going on for two weeks. He went to [facility] and was told he had some "fluid in his lungs". He was started on Cardizem which he has not been taking. On 4T, he was started on a cardizem drip and converted to sinus rhythm.       Problems discussed and associated plan:  #A-Fib  - Monitor on tele  - Replete electrolytes, K>4 and Mag>2   -s/p Cardizem ggt 10 mg/hr for rate control  - Transition to metoprolol tartrate 25mg BID   -start Eliquis (does not interact with medication pluvicto)   -f/u TTE read  - EKG: NS this AM  - IV Lasix 40mg x1 (IVC large on TTE)    #Prostate CA with mets to bone  -Continue Prednisone 2.5 Home dose  - Pluvicto every 6 weeks  - F/u With [facility]     GI/DVT Prophylaxis  Please contact me with any questions or concerns at [phone]. Assessment:  63 year old man with Prostate CA presents with palpitations and shortness of breath, found to be in new onset atrial fibrillation. He says his symptoms have been going on for two weeks. [place] [facility] [place] told he had some "fluid in his lungs". He was started on Cardizem which he has not been taking. On 4T, he was started on a cardizem drip and converted to sinus rhythm.       Problems discussed and associated plan:  #A-Fib  - Monitor on tele  - Replete electrolytes, K>4 and Mag>2   -s/p Cardizem ggt 10 mg/hr for rate control  - Transition to metoprolol tartrate 25mg BID   -start Eliquis (does not interact with medication pluvicto)   -f/u TTE read  - EKG: NS this AM  - IV Lasix 40mg x1 (IVC large on TTE), patient short of breath    #Prostate CA with mets to bone  -Continue Prednisone 2.5 Home dose  - Pluvicto every 6 weeks  - F/u With [facility]     GI/DVT Prophylaxis  Please contact me with any questions or concerns at [phone]. Assessment:  63 year old man with Prostate CA presents with palpitations and shortness of breath, found to be in new onset atrial fibrillation. He says his symptoms have been going on for two weeks. He went to [facility] and was told he had some "fluid in his lungs". He was started on Cardizem which he has not been taking. On 4T, he was started on a cardizem drip and converted to sinus rhythm.       Problems discussed and associated plan:  #A-Fib  - Monitor on tele  - Replete electrolytes, K>4 and Mag>2   -s/p Cardizem ggt 10 mg/hr for rate control  - Transition to metoprolol tartrate 25mg BID   -start Eliquis (does not interact with medication pluvicto)   -f/u TTE read  - EKG: NS this AM  - IV Lasix 40mg x1 (IVC large on TTE), patient short of breath    #Prostate CA with mets to bone  -Continue Prednisone 2.5 Home dose  - Pluvicto every 6 weeks  - F/u With [facility]     GI/DVT Prophylaxis  Please contact me with any questions or concerns at [phone].

## 2023-12-10 NOTE — PATIENT PROFILE ADULT - FUNCTIONAL ASSESSMENT - DAILY ACTIVITY SECTION LABEL
Returning patient's call.   LOV with Sara Villar PA-C. At that point he was on steroids and Gabapentin and was doing well during that appointment and seemed to be better.    Ran out of Gabapentin, was able to get it refilled through his PCP, however when he started to wean himself off the Gabapentin his symptoms worsened. He asked I speak to his wife.   She confirmed that the Gabapentin helps when he is taking. She would like to keep him on that for a few months and will call our office if symptoms worsen. Aware PCP will manage Gabapentin.   Will discuss with provider and update her with any further requests.        .

## 2023-12-10 NOTE — DISCHARGE NOTE PROVIDER - HOSPITAL COURSE
63 year old man with Prostate CA[place] [facility] [place] [facility] ED 12/9/23 with palpitations and shortness of breath, found to be in new onset atrial fibrillation. He was started on IV Cardizem for rate control and spontaneously converted to SR. He was started on Eliquis 5mg PO BID for thromboembolic prophylaxis. IV Cardizem was transitioned to Lopressor 25mg PO BID. TTE 12/10/23 was performed and showed LVEF 65% with Grade II DD and dilated IVC. He was noted to have slight volume overload with plump IVC and BNP of 495 so Lasix 40mg IVP was given x1. He was able to ambulate around the unit with no SOB and remained in SR so on 12/10/23 he was felt to be HD Stable and discharged home with plan for close outpatient follow up. He will be preserved Lasix 20mg PO Daily PRN for SOB of weight gain >3 LBS 63 year old man with Prostate CA(followed at [facility] [place]) presented to [facility] ED 12/9/23 with palpitations and shortness of breath, found to be in new onset atrial fibrillation. He was started on IV Cardizem for rate control and spontaneously converted to SR. He was started on Eliquis 5mg PO BID for thromboembolic prophylaxis. IV Cardizem was transitioned to Lopressor 25mg PO BID. TTE 12/10/23 was performed and showed LVEF 65% with Grade II DD and dilated IVC. He was noted to have slight volume overload with plump IVC and BNP of 495 so Lasix 40mg IVP was given x1. He was able to ambulate around the unit with no SOB and remained in SR so on 12/10/23 he was felt to be HD Stable and discharged home with plan for close outpatient follow up. He will be preserved Lasix 20mg PO Daily PRN for SOB of weight gain >3 LBS

## 2023-12-10 NOTE — DISCHARGE NOTE PROVIDER - CARE PROVIDER_API CALL
Behuria, [facility]  Cardiology  [address] [place] [address], [address]  [place] [address]  Phone: [phone]  Fax: [phone]  Follow Up Time: 2 weeks   Behuria, [facility]  Cardiology  [address] [place] [address], [address]  [place] [address]  Phone: [phone]  Fax: [phone]  Follow Up Time: 2 weeks

## 2023-12-10 NOTE — DISCHARGE NOTE PROVIDER - NSDCCPCAREPLAN_GEN_ALL_CORE_FT
PRINCIPAL DISCHARGE DIAGNOSIS  Diagnosis: Atrial fibrillation with RVR  Assessment and Plan of Treatment:

## 2023-12-10 NOTE — PATIENT PROFILE ADULT - FALL HARM RISK - HARM RISK INTERVENTIONS
Communicate Risk of Fall with Harm to all staff/Reinforce activity limits and safety measures with patient and family/Tailored Fall Risk Interventions/Visual Cue: Yellow wristband and red socks/Bed in lowest position, wheels locked, appropriate side rails in place/Call bell, personal items and telephone in reach/Instruct patient to call for assistance before getting out of bed or chair/Non-slip footwear when patient is out of bed/[place] to call system/Physically safe environment - no spills, clutter or unnecessary equipment/Purposeful Proactive Rounding/Room/bathroom lighting operational, light cord in reach Communicate Risk of Fall with Harm to all staff/Reinforce activity limits and safety measures with patient and family/Tailored Fall Risk Interventions/Visual Cue: Yellow wristband and red socks/Bed in lowest position, wheels locked, appropriate side rails in place/Call bell, personal items and telephone in reach/Instruct patient to call for assistance before getting out of bed or chair/Non-slip footwear when patient is out of bed/[place] to call system/Physically safe environment - no spills, clutter or unnecessary equipment/Purposeful Proactive Rounding/Room/bathroom lighting operational, light cord in reach

## 2023-12-10 NOTE — DISCHARGE NOTE PROVIDER - NSDCFUADDINST_GEN_ALL_CORE_FT
Take Lasix 20mg PO Daily as needed for SOB of weight gain > 3 LBS  Start Eliquis 5mg Twice per day  Start Lopressor 25mg twice per day  Start Protonix 40mg Daily  Stop Cardizem

## 2023-12-10 NOTE — DISCHARGE NOTE PROVIDER - DISCHARGING ATTENDING PHYSICIAN:
[facility]

## 2023-12-10 NOTE — PROGRESS NOTE ADULT - SUBJECTIVE AND OBJECTIVE BOX
Chief complaint: Patient is a 63y old  Male who presents with a chief complaint of SOB    Interval history: Patient seen and examined this AM. Patient denies symptoms and states he feels better. NS on tele and EKG this AM.    Review of systems: A complete 10-point review of systems was obtained and is negative except as stated in the interval history.    Vitals:  T(F): 97.6, Max: 98.8 (12-10 @ 04:24)  HR: 87 (87 - 135)  BP: 114/58 (95/54 - 175/114)  RR: 18 (18 - 22)  SpO2: 95% (93% - 99%)    Ins & outs:     12-09 @ 07:01  -  12-10 @ 07:00  --------------------------------------------------------  IN: 200 mL / OUT: 0 mL / NET: 200 mL    12-10 @ 07:01  -  12-10 @ 12:51  --------------------------------------------------------  IN: 470 mL / OUT: 0 mL / NET: 470 mL      Weight trend:  Weight (kg): 93.4 (12-09)    Physical exam:  General: No apparent distress  HEENT: Anicteric sclera. Moist mucous membranes. JVP negative.   Cardiac: Regular rate and rhythm. No murmurs, rubs, or gallops.   Vascular: Symmetric radial pulses. Dorsalis pedis pulses palpable.   Respiratory: Normal effort. CTAB  Abdomen: Soft, nontender. Audible bowel sounds.   Extremities: Warm with no edema. No cyanosis or clubbing.   Skin: Warm and dry. No rash.   Neurologic: Grossly normal motor function.   Psychiatric: Oriented to person, place, and time.     Data reviewed:  - Telemetry: NS HR: 80s-90s    - ECG (12/9/23):   Diagnosis Line Atrial fibrillation with rapid ventricular response  Nonspecific ST abnormality  Abnormal ECG      - TTE (12/10/23):   Summary:   1. LV Ejection Fraction by Velarde's Methodwith a biplane EF of 65 %.   2. Mild concentric left ventricular hypertrophy.   3. Spectral Doppler shows pseudonormal pattern of left ventricular   myocardial filling (Grade II diastolic dysfunction).   4. Mildly enlarged left atrium.   5. No evidence of mitral valve regurgitation.   6. Mild aortic valve stenosis.   7. LVOT Vmax 1.6 m/s and AV vmax: 2.2 m/s.    - Chest x-ray (12/9/23):   Impression:  No radiographic evidence of acute cardiopulmonary disease.        - Labs:                        10.3   14.40 )-----------( 199      ( 10 Dec 2023 06:01 )             32.7     12-10    139  |  103  |  14  ----------------------------<  124<H>  3.3<L>   |  23  |  0.9    Ca    8.7      10 Dec 2023 06:01  Mg     1.9     12-10    TPro  5.5<L>  /  Alb  3.7  /  TBili  0.4  /  DBili  x   /  AST  16  /  ALT  16  /  AlkPhos  225<H>  12-10    PT/INR - ( 10 Dec 2023 06:01 )   PT: 16.30 sec;   INR: 1.42 ratio         PTT - ( 10 Dec 2023 06:01 )  PTT:31.2 sec  Troponin T, Serum: <0.01 ng/mL (12-10-23 @ 06:01)  Troponin T, Serum: <0.01 ng/mL (12-09-23 @ 16:34)            Urinalysis Basic - ( 10 Dec 2023 06:01 )    Color: x / Appearance: x / SG: x / pH: x  Gluc: 124 mg/dL / Ketone: x  / Bili: x / Urobili: x   Blood: x / Protein: x / Nitrite: x   Leuk Esterase: x / RBC: x / WBC x   Sq Epi: x / Non Sq Epi: x / Bacteria: x        Medications:  apixaban 5 milliGRAM(s) Oral every 12 hours  influenza  Vaccine (HIGH DOSE) 0.7 milliLiter(s) IntraMuscular once  metoprolol tartrate 25 milliGRAM(s) Oral two times a day  pantoprazole    Tablet 40 milliGRAM(s) Oral before breakfast  potassium chloride    Tablet ER 40 milliEquivalent(s) Oral every 4 hours  predniSONE   Tablet 2.5 milliGRAM(s) Oral daily    Drips:    PRN:     Allergies    No Known Allergies    Intolerances

## 2023-12-11 PROBLEM — Z00.00 ENCOUNTER FOR PREVENTIVE HEALTH EXAMINATION: Status: ACTIVE | Noted: 2023-12-11

## 2023-12-11 LAB
TSH SERPL-MCNC: 3.62 UIU/ML — SIGNIFICANT CHANGE UP (ref 0.27–4.2)
TSH SERPL-MCNC: 3.62 UIU/ML — SIGNIFICANT CHANGE UP (ref 0.27–4.2)

## 2023-12-12 PROBLEM — Z00.00 ENCOUNTER FOR PREVENTIVE HEALTH EXAMINATION: Status: ACTIVE | Noted: 2023-12-12

## 2023-12-12 RX ORDER — DILTIAZEM HCL 120 MG
1 CAPSULE, EXT RELEASE 24 HR ORAL
Refills: 0 | DISCHARGE

## 2023-12-13 DIAGNOSIS — C61 MALIGNANT NEOPLASM OF PROSTATE: ICD-10-CM

## 2023-12-13 DIAGNOSIS — Z79.52 LONG TERM (CURRENT) USE OF SYSTEMIC STEROIDS: ICD-10-CM

## 2023-12-13 DIAGNOSIS — I10 ESSENTIAL (PRIMARY) HYPERTENSION: ICD-10-CM

## 2023-12-13 DIAGNOSIS — I48.91 UNSPECIFIED ATRIAL FIBRILLATION: ICD-10-CM

## 2023-12-13 DIAGNOSIS — E78.5 HYPERLIPIDEMIA, UNSPECIFIED: ICD-10-CM

## 2023-12-13 DIAGNOSIS — C79.51 SECONDARY MALIGNANT NEOPLASM OF BONE: ICD-10-CM

## 2023-12-28 ENCOUNTER — APPOINTMENT (OUTPATIENT)
Dept: CARDIOLOGY | Facility: CLINIC | Age: 63
End: 2023-12-28

## 2023-12-28 ENCOUNTER — APPOINTMENT (OUTPATIENT)
Dept: CARDIOLOGY | Facility: CLINIC | Age: 63
End: 2023-12-28
Payer: COMMERCIAL

## 2023-12-28 VITALS
RESPIRATION RATE: 16 BRPM | BODY MASS INDEX: 29.4 KG/M2 | WEIGHT: 210 LBS | HEART RATE: 76 BPM | HEIGHT: 71 IN | TEMPERATURE: 98.8 F | OXYGEN SATURATION: 99 %

## 2023-12-28 VITALS — SYSTOLIC BLOOD PRESSURE: 123 MMHG | DIASTOLIC BLOOD PRESSURE: 62 MMHG

## 2023-12-28 DIAGNOSIS — C61 MALIGNANT NEOPLASM OF PROSTATE: ICD-10-CM

## 2023-12-28 DIAGNOSIS — Z78.9 OTHER SPECIFIED HEALTH STATUS: ICD-10-CM

## 2023-12-28 PROCEDURE — 99204 OFFICE O/P NEW MOD 45 MIN: CPT | Mod: 25

## 2023-12-28 PROCEDURE — 93000 ELECTROCARDIOGRAM COMPLETE: CPT | Mod: 59

## 2023-12-28 PROCEDURE — 93270 REMOTE 30 DAY ECG REV/REPORT: CPT

## 2023-12-28 RX ORDER — FUROSEMIDE 40 MG/1
40 TABLET ORAL DAILY
Qty: 30 | Refills: 2 | Status: ACTIVE | COMMUNITY
Start: 2023-12-28 | End: 1900-01-01

## 2023-12-28 NOTE — ASSESSMENT
[FreeTextEntry1] : 63 year old man with paroxysmal atrial fibrillation, HFpEF and Prostate CA who presents to establish care after hospital stay 12/9/23 to 12/10/23.   1. Paroxysmal atrial fibrillation: in sinus rhythm; continues to be symptomatic.  - 30 day MCOT for arrhythmia burden - Continue eliquis for stroke ppx - Continue metoprolol tartrate 25mg BID for rate control  2. HFpEF: He is euvolemic on exam today. His potassium was low on blood work done at [facility].  - Stop lasix - Start spironolactone 25mg daily; check BMP in one week - Can still use lasix PRN weight gain/symptoms - Discussed a high potassium diet - I would like to do a stress test but they want to hold off until after his evaluation for pulmonary hypertension  The prevention of heart disease was discussed in detail with the patient, including adhering to a heart healthy, plant based, or Mediterranean diet, and the importance of 30 minutes of moderate intensity activity for 30 minutes, 5 times a week. All the patient's questions were answered.  RTC in 1 month.

## 2023-12-28 NOTE — CARDIOLOGY SUMMARY
[de-identified] : 12/28/23: normal sinus rhythm [de-identified] : 12/10/23:  1. LV Ejection Fraction by Velarde's Method with a biplane EF of 65 %.  2. Mild concentric left ventricular hypertrophy.  3. Spectral Doppler shows pseudonormal pattern of left ventricular myocardial filling (Grade II diastolic dysfunction).  4. Mildly enlarged left atrium.  5. No evidence of mitral valve regurgitation.  6. Mild aortic valve stenosis.  7. LVOT Vmax 1.6 m/s and AV vmax: 2.2 m/s.

## 2023-12-28 NOTE — HISTORY OF PRESENT ILLNESS
[FreeTextEntry1] : JOY JACK is a 63 year old man with paroxysmal atrial fibrillation, HFpEF and Prostate CA who presents to establish care after hospital stay 12/9/23 to 12/10/23.   He presented to the hospital with palpitations and shortness of breath and was found to be in new onset atrial fibrillation. He was started on a cardizem drip and spontaneously converted to sinus rhythm. He was started on Eliquis 5mg PO BID for thromboembolic prophylaxis. IV Cardizem was transitioned to Lopressor 25mg PO BID. TTE 12/10/23 was performed and showed LVEF 65% with Grade II DD and dilated IVC. He was noted to have slight volume overload with plump IVC and BNP of 495 so Lasix 40mg IVP was given x1. He was discharged to follow up.   Today, he is still having shortness of breath with exertion. He notices leg swelling at the end of the day. He is checking his weight daily and has had to lasix 40mg one time. He continues to have palpitations on and off. He does not have chest pain. No syncope. No orthopnea or PND.   His doctors at [facility] are sending him to [facility] to be evaluated for pulmonary HTN.

## 2024-01-04 LAB
ANION GAP SERPL CALC-SCNC: 15 MMOL/L
BUN SERPL-MCNC: 17 MG/DL
CALCIUM SERPL-MCNC: 9.2 MG/DL
CHLORIDE SERPL-SCNC: 102 MMOL/L
CO2 SERPL-SCNC: 23 MMOL/L
CREAT SERPL-MCNC: 0.9 MG/DL
EGFR: 95 ML/MIN/1.73M2
GLUCOSE SERPL-MCNC: 131 MG/DL
NT-PROBNP SERPL-MCNC: 623 PG/ML
POTASSIUM SERPL-SCNC: 3.9 MMOL/L
SODIUM SERPL-SCNC: 140 MMOL/L

## 2024-01-18 ENCOUNTER — TRANSCRIPTION ENCOUNTER (OUTPATIENT)
Age: 64
End: 2024-01-18

## 2024-01-18 ENCOUNTER — EMERGENCY (EMERGENCY)
Facility: HOSPITAL | Age: 64
LOS: 0 days | Discharge: HOME | End: 2024-01-19
Attending: EMERGENCY MEDICINE
Payer: COMMERCIAL

## 2024-01-18 VITALS
HEART RATE: 80 BPM | TEMPERATURE: 98 F | OXYGEN SATURATION: 100 % | DIASTOLIC BLOOD PRESSURE: 66 MMHG | HEIGHT: 71 IN | SYSTOLIC BLOOD PRESSURE: 144 MMHG | RESPIRATION RATE: 18 BRPM

## 2024-01-18 DIAGNOSIS — I48.91 UNSPECIFIED ATRIAL FIBRILLATION: ICD-10-CM

## 2024-01-18 DIAGNOSIS — R31.9 HEMATURIA, UNSPECIFIED: ICD-10-CM

## 2024-01-18 DIAGNOSIS — Z98.890 OTHER SPECIFIED POSTPROCEDURAL STATES: Chronic | ICD-10-CM

## 2024-01-18 DIAGNOSIS — Z79.01 LONG TERM (CURRENT) USE OF ANTICOAGULANTS: ICD-10-CM

## 2024-01-18 DIAGNOSIS — I10 ESSENTIAL (PRIMARY) HYPERTENSION: ICD-10-CM

## 2024-01-18 DIAGNOSIS — Z85.46 PERSONAL HISTORY OF MALIGNANT NEOPLASM OF PROSTATE: ICD-10-CM

## 2024-01-18 DIAGNOSIS — N39.0 URINARY TRACT INFECTION, SITE NOT SPECIFIED: ICD-10-CM

## 2024-01-18 PROCEDURE — 85025 COMPLETE CBC W/AUTO DIFF WBC: CPT

## 2024-01-18 PROCEDURE — 85610 PROTHROMBIN TIME: CPT

## 2024-01-18 PROCEDURE — 81001 URINALYSIS AUTO W/SCOPE: CPT

## 2024-01-18 PROCEDURE — 36415 COLL VENOUS BLD VENIPUNCTURE: CPT

## 2024-01-18 PROCEDURE — 85730 THROMBOPLASTIN TIME PARTIAL: CPT

## 2024-01-18 PROCEDURE — 80053 COMPREHEN METABOLIC PANEL: CPT

## 2024-01-18 PROCEDURE — 99283 EMERGENCY DEPT VISIT LOW MDM: CPT

## 2024-01-18 PROCEDURE — 99284 EMERGENCY DEPT VISIT MOD MDM: CPT

## 2024-01-18 PROCEDURE — 87086 URINE CULTURE/COLONY COUNT: CPT

## 2024-01-18 NOTE — ED ADULT TRIAGE NOTE - CHIEF COMPLAINT QUOTE
Pt c/o 2 days of burning when urinating. Pt started taking cefuroxime since yesterday. Pt states last night he started having hematuria. Pt states he takes

## 2024-01-19 VITALS
TEMPERATURE: 98 F | OXYGEN SATURATION: 96 % | DIASTOLIC BLOOD PRESSURE: 63 MMHG | SYSTOLIC BLOOD PRESSURE: 146 MMHG | RESPIRATION RATE: 18 BRPM | HEART RATE: 82 BPM

## 2024-01-19 PROBLEM — I10 ESSENTIAL (PRIMARY) HYPERTENSION: Chronic | Status: ACTIVE | Noted: 2023-12-09

## 2024-01-19 PROBLEM — E78.5 HYPERLIPIDEMIA, UNSPECIFIED: Chronic | Status: ACTIVE | Noted: 2023-12-09

## 2024-01-19 PROBLEM — E11.9 TYPE 2 DIABETES MELLITUS WITHOUT COMPLICATIONS: Chronic | Status: ACTIVE | Noted: 2023-12-09

## 2024-01-19 PROBLEM — C61 MALIGNANT NEOPLASM OF PROSTATE: Chronic | Status: ACTIVE | Noted: 2023-12-09

## 2024-01-19 LAB
ACANTHOCYTES BLD QL SMEAR: SLIGHT — SIGNIFICANT CHANGE UP
ALBUMIN SERPL ELPH-MCNC: 4.7 G/DL — SIGNIFICANT CHANGE UP (ref 3.5–5.2)
ALP SERPL-CCNC: 329 U/L — HIGH (ref 30–115)
ALT FLD-CCNC: 11 U/L — SIGNIFICANT CHANGE UP (ref 0–41)
ANION GAP SERPL CALC-SCNC: 13 MMOL/L — SIGNIFICANT CHANGE UP (ref 7–14)
ANISOCYTOSIS BLD QL: SLIGHT — SIGNIFICANT CHANGE UP
APPEARANCE UR: CLEAR — SIGNIFICANT CHANGE UP
APTT BLD: 30.7 SEC — SIGNIFICANT CHANGE UP (ref 27–39.2)
AST SERPL-CCNC: 18 U/L — SIGNIFICANT CHANGE UP (ref 0–41)
BACTERIA # UR AUTO: ABNORMAL /HPF
BASOPHILS # BLD AUTO: 0.95 K/UL — HIGH (ref 0–0.2)
BASOPHILS NFR BLD AUTO: 6.4 % — HIGH (ref 0–1)
BILIRUB SERPL-MCNC: 0.5 MG/DL — SIGNIFICANT CHANGE UP (ref 0.2–1.2)
BILIRUB UR-MCNC: NEGATIVE — SIGNIFICANT CHANGE UP
BUN SERPL-MCNC: 18 MG/DL — SIGNIFICANT CHANGE UP (ref 10–20)
BURR CELLS BLD QL SMEAR: PRESENT — SIGNIFICANT CHANGE UP
CALCIUM SERPL-MCNC: 9.4 MG/DL — SIGNIFICANT CHANGE UP (ref 8.4–10.5)
CAST: 0 /LPF — SIGNIFICANT CHANGE UP (ref 0–4)
CHLORIDE SERPL-SCNC: 101 MMOL/L — SIGNIFICANT CHANGE UP (ref 98–110)
CO2 SERPL-SCNC: 24 MMOL/L — SIGNIFICANT CHANGE UP (ref 17–32)
COLOR SPEC: SIGNIFICANT CHANGE UP
CREAT SERPL-MCNC: 0.8 MG/DL — SIGNIFICANT CHANGE UP (ref 0.7–1.5)
DIFF PNL FLD: ABNORMAL
EGFR: 98 ML/MIN/1.73M2 — SIGNIFICANT CHANGE UP
EOSINOPHIL # BLD AUTO: 0.4 K/UL — SIGNIFICANT CHANGE UP (ref 0–0.7)
EOSINOPHIL NFR BLD AUTO: 2.7 % — SIGNIFICANT CHANGE UP (ref 0–8)
EPI CELLS # UR: PRESENT
GIANT PLATELETS BLD QL SMEAR: PRESENT — SIGNIFICANT CHANGE UP
GLUCOSE SERPL-MCNC: 114 MG/DL — HIGH (ref 70–99)
GLUCOSE UR QL: NEGATIVE MG/DL — SIGNIFICANT CHANGE UP
HCT VFR BLD CALC: 33.7 % — LOW (ref 42–52)
HGB BLD-MCNC: 10.6 G/DL — LOW (ref 14–18)
HYALINE CASTS # UR AUTO: 0 /LPF — SIGNIFICANT CHANGE UP (ref 0–4)
HYPOCHROMIA BLD QL: SLIGHT — SIGNIFICANT CHANGE UP
INR BLD: 1.25 RATIO — SIGNIFICANT CHANGE UP (ref 0.65–1.3)
KETONES UR-MCNC: ABNORMAL MG/DL
LEUKOCYTE ESTERASE UR-ACNC: NEGATIVE — SIGNIFICANT CHANGE UP
LYMPHOCYTES # BLD AUTO: 28.2 % — SIGNIFICANT CHANGE UP (ref 20.5–51.1)
LYMPHOCYTES # BLD AUTO: 4.19 K/UL — HIGH (ref 1.2–3.4)
MANUAL SMEAR VERIFICATION: SIGNIFICANT CHANGE UP
MCHC RBC-ENTMCNC: 22.5 PG — LOW (ref 27–31)
MCHC RBC-ENTMCNC: 31.5 G/DL — LOW (ref 32–37)
MCV RBC AUTO: 71.4 FL — LOW (ref 80–94)
MICROCYTES BLD QL: SLIGHT — SIGNIFICANT CHANGE UP
MONOCYTES # BLD AUTO: 0.82 K/UL — HIGH (ref 0.1–0.6)
MONOCYTES NFR BLD AUTO: 5.5 % — SIGNIFICANT CHANGE UP (ref 1.7–9.3)
NEUTROPHILS # BLD AUTO: 5.13 K/UL — SIGNIFICANT CHANGE UP (ref 1.4–6.5)
NEUTROPHILS NFR BLD AUTO: 34.5 % — LOW (ref 42.2–75.2)
NITRITE UR-MCNC: NEGATIVE — SIGNIFICANT CHANGE UP
NRBC # BLD: 3 /100 WBCS — HIGH (ref 0–0)
NRBC # BLD: SIGNIFICANT CHANGE UP /100 WBCS (ref 0–0)
OVALOCYTES BLD QL SMEAR: SLIGHT — SIGNIFICANT CHANGE UP
PH UR: 6 — SIGNIFICANT CHANGE UP (ref 5–8)
PLAT MORPH BLD: ABNORMAL
PLATELET # BLD AUTO: 228 K/UL — SIGNIFICANT CHANGE UP (ref 130–400)
PMV BLD: 10.1 FL — SIGNIFICANT CHANGE UP (ref 7.4–10.4)
POIKILOCYTOSIS BLD QL AUTO: SLIGHT — SIGNIFICANT CHANGE UP
POLYCHROMASIA BLD QL SMEAR: SLIGHT — SIGNIFICANT CHANGE UP
POTASSIUM SERPL-MCNC: 3.6 MMOL/L — SIGNIFICANT CHANGE UP (ref 3.5–5)
POTASSIUM SERPL-SCNC: 3.6 MMOL/L — SIGNIFICANT CHANGE UP (ref 3.5–5)
PROT SERPL-MCNC: 6.5 G/DL — SIGNIFICANT CHANGE UP (ref 6–8)
PROT UR-MCNC: 300 MG/DL
PROTHROM AB SERPL-ACNC: 14.3 SEC — HIGH (ref 9.95–12.87)
RBC # BLD: 4.72 M/UL — SIGNIFICANT CHANGE UP (ref 4.7–6.1)
RBC # FLD: 17.1 % — HIGH (ref 11.5–14.5)
RBC BLD AUTO: ABNORMAL
RBC CASTS # UR COMP ASSIST: >720 /HPF — HIGH (ref 0–4)
SCHISTOCYTES BLD QL AUTO: SLIGHT — SIGNIFICANT CHANGE UP
SMUDGE CELLS # BLD: PRESENT — SIGNIFICANT CHANGE UP
SODIUM SERPL-SCNC: 138 MMOL/L — SIGNIFICANT CHANGE UP (ref 135–146)
SP GR SPEC: 1.02 — SIGNIFICANT CHANGE UP (ref 1–1.03)
SQUAMOUS # UR AUTO: 0 /HPF — SIGNIFICANT CHANGE UP (ref 0–5)
UROBILINOGEN FLD QL: 1 MG/DL — SIGNIFICANT CHANGE UP (ref 0.2–1)
VARIANT LYMPHS # BLD: 22.7 % — HIGH (ref 0–5)
WBC # BLD: 14.87 K/UL — HIGH (ref 4.8–10.8)
WBC # FLD AUTO: 14.87 K/UL — HIGH (ref 4.8–10.8)
WBC UR QL: 3 /HPF — SIGNIFICANT CHANGE UP (ref 0–5)

## 2024-01-19 RX ORDER — CEFUROXIME AXETIL 250 MG
1 TABLET ORAL
Qty: 14 | Refills: 0
Start: 2024-01-19 | End: 2024-01-25

## 2024-01-19 NOTE — ED PROVIDER NOTE - PATIENT PORTAL LINK FT
You can access the FollowMyHealth Patient Portal offered by [facility] by registering at the following website: [email or web address]. By joining [employer]’s FollowMyHealth portal, you will also be able to view your health information using other applications (apps) compatible with our system.

## 2024-01-19 NOTE — ED PROVIDER NOTE - OBJECTIVE STATEMENT
64yo male PMHx significant for prostate ca s/p turp, RT, chemo, follows at [facility], and a fib on eliquis presenting with 3 days of hematuria; sx initially began with dysuria, but this improved since starting leftover Cefuroxime he had at home. No F/C, no abdominal pain, no n/v, no dizziness/lightheadedness/syncope/cp/sob, no other complaints.

## 2024-01-19 NOTE — ED PROVIDER NOTE - ATTENDING CONTRIBUTION TO CARE
63-year-old male with PMH HTN, Afib on Eliquis, history of prostate cancer, status post radiation, chemotherapy, following with urologist at [facility] presents for evaluation of hematuria x 3 days.  Patient states he has been diagnosed with hemorrhagic cystitis in the past and is currently on cefuroxime.  Tonight noted his hematuria was slightly worse so presented for evaluation.  Patient on Eliquis for A-fib so hold held his evening dose.  Denies any fevers, chills, abdominal pain, flank pain, chest pain, shortness of breath, dizziness, weakness.  Tolerating p.o. with his usual appetite.  Reports slight dysuria, no frequency or urgency.    VITAL SIGNS: noted  CONSTITUTIONAL: Well-developed; well-nourished; in no acute distress  HEAD: Normocephalic; atraumatic  EYES: PERRL, EOM intact; conjunctiva and sclera clear  ENT: No nasal discharge; airway clear. MMM  NECK: Supple; non tender.    CARD: S1, S2 normal; no murmurs, gallops, or rubs. Regular rate and rhythm  RESP: CTAB/L, no wheezes, rales or rhonchi  ABD: Normal bowel sounds; soft; non-distended; non-tender; no CVA tenderness  EXT: Normal ROM. No calf tenderness or edema. Distal pulses intact  NEURO: Alert, oriented. Grossly unremarkable. No focal deficits  SKIN: Skin exam is warm and dry, no acute rash  MS: No midline spinal tenderness

## 2024-01-19 NOTE — ED PROVIDER NOTE - PROGRESS NOTE DETAILS
MAXY: pt says he went to the bathroom and the hematuria has alsmost entirely resolved. He clarified the Cefuroxime he has been on was leftover from a previous Rx, he is agreeable with plan to complete the course outpatient, will f/u with Urology and [facility].

## 2024-01-19 NOTE — ED PROVIDER NOTE - NSFOLLOWUPINSTRUCTIONS_ED_ALL_ED_FT
Follow-up with your urologist at [facility]. Let them know about your symptoms and your visit to the emergency department.     Urinary Tract Infection    A urinary tract infection (UTI) is an infection of any part of the urinary tract, which includes the kidneys, ureters, bladder, and urethra. Risk factors include ignoring your need to urinate, wiping back to front if female, being an uncircumcised male, and having diabetes or a weak immune system. Symptoms include frequent urination, pain or burning with urination, foul smelling urine, cloudy urine, pain in the lower abdomen, blood in the urine, and fever. If you were prescribed an antibiotic medicine, take it as told by your health care provider. Do not stop taking the antibiotic even if you start to feel better.    SEEK IMMEDIATE MEDICAL CARE IF YOU HAVE ANY OF THE FOLLOWING SYMPTOMS: severe back or abdominal pain, fever, inability to keep fluids or medicine down, dizziness/lightheadedness, or a change in mental status.

## 2024-01-19 NOTE — ED PROVIDER NOTE - CLINICAL SUMMARY MEDICAL DECISION MAKING FREE TEXT BOX
Labs show good sugar control. Good cholesterol numbers. Improved liver enzyme levels. Thyroid is LOW. I need to adjust dose higher. I will send a new prescription. Follow up 6 months.
Patient evaluated for hematuria, UA noted, labs reviewed.  Patient reports feels well to go home.  Advise continuing on oral abx he is taking and close follow-up with his urologist in 1 to 2 days.  Urine culture sent.  Strict return precautions advised and patient verbalized understanding.

## 2024-01-20 LAB
CULTURE RESULTS: NO GROWTH — SIGNIFICANT CHANGE UP
SPECIMEN SOURCE: SIGNIFICANT CHANGE UP

## 2024-01-29 ENCOUNTER — NON-APPOINTMENT (OUTPATIENT)
Age: 64
End: 2024-01-29

## 2024-02-05 ENCOUNTER — APPOINTMENT (OUTPATIENT)
Dept: CARDIOLOGY | Facility: CLINIC | Age: 64
End: 2024-02-05
Payer: COMMERCIAL

## 2024-02-05 VITALS
HEART RATE: 67 BPM | BODY MASS INDEX: 28.14 KG/M2 | HEIGHT: 71 IN | DIASTOLIC BLOOD PRESSURE: 62 MMHG | SYSTOLIC BLOOD PRESSURE: 120 MMHG | WEIGHT: 201 LBS

## 2024-02-05 PROCEDURE — 99214 OFFICE O/P EST MOD 30 MIN: CPT | Mod: 25

## 2024-02-05 PROCEDURE — 93000 ELECTROCARDIOGRAM COMPLETE: CPT

## 2024-02-05 PROCEDURE — G2211 COMPLEX E/M VISIT ADD ON: CPT | Mod: NC,1L

## 2024-02-05 RX ORDER — APIXABAN 5 MG/1
5 TABLET, FILM COATED ORAL
Qty: 180 | Refills: 3 | Status: DISCONTINUED | COMMUNITY
Start: 1900-01-01 | End: 2024-02-05

## 2024-02-05 NOTE — CARDIOLOGY SUMMARY
[de-identified] : 2/5/24: normal sinus rhythm 12/28/23: normal sinus rhythm [de-identified] : 1/30/24: 30 MCOT no atrial fibrillation [de-identified] : 12/10/23:  1. LV Ejection Fraction by Velarde's Method with a biplane EF of 65 %.  2. Mild concentric left ventricular hypertrophy.  3. Spectral Doppler shows pseudonormal pattern of left ventricular myocardial filling (Grade II diastolic dysfunction).  4. Mildly enlarged left atrium.  5. No evidence of mitral valve regurgitation.  6. Mild aortic valve stenosis.  7. LVOT Vmax 1.6 m/s and AV vmax: 2.2 m/s.

## 2024-02-05 NOTE — HISTORY OF PRESENT ILLNESS
[FreeTextEntry1] : JOY JACK is a 64 year old man with paroxysmal atrial fibrillation, HFpEF and Prostate CA who presents for follow up today.  He presented to the hospital with palpitations and shortness of breath and was found to be in new onset atrial fibrillation. He was started on a cardizem drip and spontaneously converted to sinus rhythm. He was started on Eliquis 5mg PO BID for thromboembolic prophylaxis. IV Cardizem was transitioned to Lopressor 25mg PO BID. TTE 12/10/23 was performed and showed LVEF 65% with Grade II DD and dilated IVC. He was noted to have slight volume overload with plump IVC and BNP of 495 so Lasix 40mg IVP was given x1. He was discharged to follow up.   Today, his shortness of breath is much improved. He is walking 3 km without issues. His palpitations have also gone. He does not have chest pain. No syncope. No orthopnea or PND.   His doctors at [facility] are sending him to [facility] to be evaluated for pulmonary HTN.

## 2024-02-05 NOTE — ASSESSMENT
[FreeTextEntry1] : 64 year old man with paroxysmal atrial fibrillation, HFpEF and Prostate CA who presents for follow up today.  1. Paroxysmal atrial fibrillation: in sinus rhythm, 30 day MCOT did not any arrhythmias. - Continue eliquis for stroke ppx; CHADSVASC is 3. - Continue metoprolol tartrate 25mg BID for rate control  2. HFpEF: He is euvolemic on exam today. His potassium was low on [place] [facility]  - Continue spironolactone 25mg daily - Can still use lasix PRN weight gain/symptoms - I would like to do a stress test but they want to hold off until after his evaluation for pulmonary hypertension; they think that the [facility] doc is going to do a stress test  The prevention of heart disease was discussed in detail with the patient, including adhering to a heart healthy, plant based, or Mediterranean diet, and the importance of 30 minutes of moderate intensity activity for 30 minutes, 5 times a week. All the patient's questions were answered.  RTC in 3 months.

## 2024-04-15 ENCOUNTER — RX RENEWAL (OUTPATIENT)
Age: 64
End: 2024-04-15

## 2024-05-06 ENCOUNTER — APPOINTMENT (OUTPATIENT)
Dept: CARDIOLOGY | Facility: CLINIC | Age: 64
End: 2024-05-06
Payer: COMMERCIAL

## 2024-05-06 VITALS
SYSTOLIC BLOOD PRESSURE: 118 MMHG | WEIGHT: 194 LBS | DIASTOLIC BLOOD PRESSURE: 60 MMHG | HEART RATE: 70 BPM | BODY MASS INDEX: 27.16 KG/M2 | HEIGHT: 71 IN

## 2024-05-06 DIAGNOSIS — I50.30 UNSPECIFIED DIASTOLIC (CONGESTIVE) HEART FAILURE: ICD-10-CM

## 2024-05-06 DIAGNOSIS — I48.0 PAROXYSMAL ATRIAL FIBRILLATION: ICD-10-CM

## 2024-05-06 PROCEDURE — 99213 OFFICE O/P EST LOW 20 MIN: CPT | Mod: 25

## 2024-05-06 PROCEDURE — 93000 ELECTROCARDIOGRAM COMPLETE: CPT

## 2024-05-06 PROCEDURE — G2211 COMPLEX E/M VISIT ADD ON: CPT | Mod: NC,1L

## 2024-05-06 RX ORDER — APIXABAN 5 MG/1
5 TABLET, FILM COATED ORAL
Qty: 60 | Refills: 5 | Status: ACTIVE | COMMUNITY
Start: 2024-02-05 | End: 1900-01-01

## 2024-05-06 RX ORDER — METOPROLOL TARTRATE 25 MG/1
25 TABLET, FILM COATED ORAL
Qty: 180 | Refills: 3 | Status: ACTIVE | COMMUNITY
Start: 1900-01-01 | End: 1900-01-01

## 2024-05-06 RX ORDER — SPIRONOLACTONE 25 MG/1
25 TABLET ORAL
Qty: 30 | Refills: 3 | Status: ACTIVE | COMMUNITY
Start: 2023-12-28 | End: 1900-01-01

## 2024-05-07 NOTE — ASSESSMENT
[FreeTextEntry1] : 64 year old man with paroxysmal atrial fibrillation, HFpEF and Prostate CA who presents for follow up today.  1. Paroxysmal atrial fibrillation: in sinus rhythm, 30 day MCOT did not any arrhythmias. - Continue eliquis for stroke ppx; CHADSVASC is 3. - Continue metoprolol tartrate 25mg BID for rate control  2. HFpEF: He is euvolemic on exam today. His potassium was low on blood work done at [facility].  - Continue spironolactone 25mg daily - Can still use lasix PRN weight gain/symptoms - I would like to do a stress test but they want to hold off until after his evaluation for pulmonary hypertension; they think that the [facility] doc is going to do a stress test  The prevention of heart disease was discussed in detail with the patient, including adhering to a heart healthy, plant based, or Mediterranean diet, and the importance of 30 minutes of moderate intensity activity for 30 minutes, 5 times a week. All the patient's questions were answered.  RTC in 3 months.

## 2024-05-07 NOTE — CARDIOLOGY SUMMARY
[de-identified] : 5/7/24: normal sinus rhythm 2/5/24: normal sinus rhythm 12/28/23: normal sinus rhythm [de-identified] : 12/10/23:  1. LV Ejection Fraction by Velarde's Method with a biplane EF of 65 %.  2. Mild concentric left ventricular hypertrophy.  3. Spectral Doppler shows pseudonormal pattern of left ventricular myocardial filling (Grade II diastolic dysfunction).  4. Mildly enlarged left atrium.  5. No evidence of mitral valve regurgitation.  6. Mild aortic valve stenosis.  7. LVOT Vmax 1.6 m/s and AV vmax: 2.2 m/s. [de-identified] : 1/30/24: 30 MCOT no atrial fibrillation

## 2024-05-07 NOTE — HISTORY OF PRESENT ILLNESS
[FreeTextEntry1] : JOY JACK is a 64 year old man with paroxysmal atrial fibrillation, HFpEF and Prostate CA who presents for follow up today.  He presented to the hospital with palpitations and shortness of breath and was found to be in new onset atrial fibrillation. He was started on a cardizem drip and spontaneously converted to sinus rhythm. He was started on Eliquis 5mg PO BID for thromboembolic prophylaxis. IV Cardizem was transitioned to Lopressor 25mg PO BID. TTE 12/10/23 was performed and showed LVEF 65% with Grade II DD and dilated IVC. He was noted to have slight volume overload with plump IVC and BNP of 495 so Lasix 40mg IVP was given x1. He was discharged to follow up.   Today, his shortness of breath is much improved. He is walking 3 km without issues. His palpitations have also gone. He does not have chest pain. No syncope. No orthopnea or PND.   His doctors at [facility] are sending him to [facility] to be evaluated for pulmonary HTN.

## 2024-08-12 ENCOUNTER — APPOINTMENT (OUTPATIENT)
Dept: CARDIOLOGY | Facility: CLINIC | Age: 64
End: 2024-08-12
Payer: COMMERCIAL

## 2024-08-12 VITALS
HEIGHT: 71 IN | BODY MASS INDEX: 28 KG/M2 | WEIGHT: 200 LBS | HEART RATE: 67 BPM | SYSTOLIC BLOOD PRESSURE: 122 MMHG | DIASTOLIC BLOOD PRESSURE: 68 MMHG

## 2024-08-12 DIAGNOSIS — I50.30 UNSPECIFIED DIASTOLIC (CONGESTIVE) HEART FAILURE: ICD-10-CM

## 2024-08-12 DIAGNOSIS — I48.0 PAROXYSMAL ATRIAL FIBRILLATION: ICD-10-CM

## 2024-08-12 PROCEDURE — 93000 ELECTROCARDIOGRAM COMPLETE: CPT

## 2024-08-12 PROCEDURE — G2211 COMPLEX E/M VISIT ADD ON: CPT | Mod: NC

## 2024-08-12 PROCEDURE — 99213 OFFICE O/P EST LOW 20 MIN: CPT | Mod: 25

## 2024-08-12 NOTE — ASSESSMENT
[FreeTextEntry1] : 64 year old man with paroxysmal atrial fibrillation, HFpEF and Prostate CA who presents for follow up today.  1. Paroxysmal atrial fibrillation: in sinus rhythm, 30 day MCOT did not any arrhythmias. - Continue eliquis for stroke ppx; CHADSVASC is 3. - Continue metoprolol tartrate 25mg BID for rate control  2. HFpEF: He is euvolemic on exam today. His potassium was low on blood work done at [facility].  - Continue spironolactone 25mg daily - Can still use lasix PRN weight gain/symptoms  The prevention of heart disease was discussed in detail with the patient, including adhering to a heart healthy, plant based, or Mediterranean diet, and the importance of 30 minutes of moderate intensity activity for 30 minutes, 5 times a week. All the patient's questions were answered.  RTC in 6 months.

## 2024-08-12 NOTE — HISTORY OF PRESENT ILLNESS
[FreeTextEntry1] : JOY JACK is a 64 year old man with paroxysmal atrial fibrillation, HFpEF and Prostate CA who presents for follow up today.  He presented to the hospital with palpitations and shortness of breath and was found to be in new onset atrial fibrillation. He was started on a cardizem drip and spontaneously converted to sinus rhythm. He was started on Eliquis 5mg PO BID for thromboembolic prophylaxis. IV Cardizem was transitioned to Lopressor 25mg PO BID. TTE 12/10/23 was performed and showed LVEF 65% with Grade II DD and dilated IVC. He was noted to have slight volume overload with plump IVC and BNP of 495 so Lasix 40mg IVP was given x1. He was discharged to follow up.   Today, his shortness of breath is much improved. He is walking 3 km without issues. His palpitations have also gone. He does not have chest pain. No syncope. No orthopnea or PND.   His doctors at [facility] are sending him to [facility] to be evaluated for pulmonary HTN.

## 2024-08-12 NOTE — CARDIOLOGY SUMMARY
[de-identified] : 8/12/24: normal sinus rhythm 5/7/24: normal sinus rhythm 2/5/24: normal sinus rhythm 12/28/23: normal sinus rhythm [de-identified] : 1/30/24: 30 MCOT no atrial fibrillation [de-identified] : 12/10/23:  1. LV Ejection Fraction by Velarde's Method with a biplane EF of 65 %.  2. Mild concentric left ventricular hypertrophy.  3. Spectral Doppler shows pseudonormal pattern of left ventricular myocardial filling (Grade II diastolic dysfunction).  4. Mildly enlarged left atrium.  5. No evidence of mitral valve regurgitation.  6. Mild aortic valve stenosis.  7. LVOT Vmax 1.6 m/s and AV vmax: 2.2 m/s.

## 2024-09-12 ENCOUNTER — RX RENEWAL (OUTPATIENT)
Age: 64
End: 2024-09-12

## 2024-10-08 ENCOUNTER — RX RENEWAL (OUTPATIENT)
Age: 64
End: 2024-10-08

## 2025-01-08 ENCOUNTER — TRANSCRIPTION ENCOUNTER (OUTPATIENT)
Age: 65
End: 2025-01-08

## 2025-02-10 ENCOUNTER — NON-APPOINTMENT (OUTPATIENT)
Age: 65
End: 2025-02-10

## 2025-02-10 ENCOUNTER — APPOINTMENT (OUTPATIENT)
Dept: CARDIOLOGY | Facility: CLINIC | Age: 65
End: 2025-02-10

## 2025-02-10 VITALS
SYSTOLIC BLOOD PRESSURE: 136 MMHG | DIASTOLIC BLOOD PRESSURE: 70 MMHG | WEIGHT: 200 LBS | BODY MASS INDEX: 28 KG/M2 | HEART RATE: 82 BPM | HEIGHT: 71 IN

## 2025-02-10 DIAGNOSIS — I48.0 PAROXYSMAL ATRIAL FIBRILLATION: ICD-10-CM

## 2025-02-10 DIAGNOSIS — I50.30 UNSPECIFIED DIASTOLIC (CONGESTIVE) HEART FAILURE: ICD-10-CM

## 2025-02-10 PROCEDURE — 99214 OFFICE O/P EST MOD 30 MIN: CPT

## 2025-02-10 PROCEDURE — G2211 COMPLEX E/M VISIT ADD ON: CPT | Mod: NC

## 2025-02-10 PROCEDURE — 93000 ELECTROCARDIOGRAM COMPLETE: CPT

## 2025-02-10 RX ORDER — PANTOPRAZOLE 40 MG/1
40 TABLET, DELAYED RELEASE ORAL
Qty: 90 | Refills: 0 | Status: ACTIVE | COMMUNITY
Start: 2024-11-21 | End: 1900-01-01

## 2025-02-18 ENCOUNTER — RX RENEWAL (OUTPATIENT)
Age: 65
End: 2025-02-18

## 2025-03-11 ENCOUNTER — APPOINTMENT (OUTPATIENT)
Dept: CARDIOLOGY | Facility: CLINIC | Age: 65
End: 2025-03-11
Payer: COMMERCIAL

## 2025-03-11 PROCEDURE — 93306 TTE W/DOPPLER COMPLETE: CPT

## 2025-03-31 ENCOUNTER — RX RENEWAL (OUTPATIENT)
Age: 65
End: 2025-03-31

## 2025-05-05 ENCOUNTER — APPOINTMENT (OUTPATIENT)
Dept: CARDIOLOGY | Facility: CLINIC | Age: 65
End: 2025-05-05

## 2025-05-08 ENCOUNTER — RX RENEWAL (OUTPATIENT)
Age: 65
End: 2025-05-08